# Patient Record
Sex: FEMALE | Race: WHITE | NOT HISPANIC OR LATINO | Employment: FULL TIME | URBAN - METROPOLITAN AREA
[De-identification: names, ages, dates, MRNs, and addresses within clinical notes are randomized per-mention and may not be internally consistent; named-entity substitution may affect disease eponyms.]

---

## 2024-02-02 ENCOUNTER — HOSPITAL ENCOUNTER (INPATIENT)
Facility: HOSPITAL | Age: 39
LOS: 4 days | Discharge: HOME | End: 2024-02-07
Attending: EMERGENCY MEDICINE | Admitting: HOSPITALIST
Payer: COMMERCIAL

## 2024-02-02 DIAGNOSIS — K80.20 CHOLELITHIASIS: ICD-10-CM

## 2024-02-02 DIAGNOSIS — K85.90 ACUTE PANCREATITIS, UNSPECIFIED COMPLICATION STATUS, UNSPECIFIED PANCREATITIS TYPE: Primary | ICD-10-CM

## 2024-02-02 LAB — D-LACTATE SERPL-SCNC: 1.6 MMOL/L (ref 0.3–2)

## 2024-02-02 PROCEDURE — 85025 COMPLETE CBC W/AUTO DIFF WBC: CPT | Performed by: EMERGENCY MEDICINE

## 2024-02-02 PROCEDURE — 83605 ASSAY OF LACTIC ACID: CPT | Performed by: EMERGENCY MEDICINE

## 2024-02-02 PROCEDURE — 83690 ASSAY OF LIPASE: CPT | Performed by: EMERGENCY MEDICINE

## 2024-02-02 PROCEDURE — 80053 COMPREHEN METABOLIC PANEL: CPT | Performed by: EMERGENCY MEDICINE

## 2024-02-02 PROCEDURE — 25010000002 MORPHINE PER 10 MG: Performed by: EMERGENCY MEDICINE

## 2024-02-02 PROCEDURE — 99285 EMERGENCY DEPT VISIT HI MDM: CPT

## 2024-02-02 PROCEDURE — 25810000003 SODIUM CHLORIDE 0.9 % SOLUTION: Performed by: EMERGENCY MEDICINE

## 2024-02-02 PROCEDURE — 25010000002 ONDANSETRON PER 1 MG: Performed by: EMERGENCY MEDICINE

## 2024-02-02 PROCEDURE — 84703 CHORIONIC GONADOTROPIN ASSAY: CPT | Performed by: EMERGENCY MEDICINE

## 2024-02-02 RX ORDER — ONDANSETRON 2 MG/ML
4 INJECTION INTRAMUSCULAR; INTRAVENOUS ONCE
Status: COMPLETED | OUTPATIENT
Start: 2024-02-02 | End: 2024-02-02

## 2024-02-02 RX ORDER — SODIUM CHLORIDE 0.9 % (FLUSH) 0.9 %
10 SYRINGE (ML) INJECTION AS NEEDED
Status: DISCONTINUED | OUTPATIENT
Start: 2024-02-02 | End: 2024-02-05

## 2024-02-02 RX ADMIN — MORPHINE SULFATE 4 MG: 4 INJECTION, SOLUTION INTRAMUSCULAR; INTRAVENOUS at 23:57

## 2024-02-02 RX ADMIN — SODIUM CHLORIDE 1000 ML: 9 INJECTION, SOLUTION INTRAVENOUS at 23:56

## 2024-02-02 RX ADMIN — ONDANSETRON 4 MG: 2 INJECTION INTRAMUSCULAR; INTRAVENOUS at 23:57

## 2024-02-02 NOTE — Clinical Note
Level of Care: Telemetry [5]   Admitting Physician: COMPA WEINSTEIN [[id number]]   Attending Physician: COMPA WEINSTEIN [[id number]]

## 2024-02-03 ENCOUNTER — INPATIENT HOSPITAL (OUTPATIENT)
Dept: URBAN - METROPOLITAN AREA HOSPITAL 84 | Facility: HOSPITAL | Age: 39
End: 2024-02-03

## 2024-02-03 ENCOUNTER — APPOINTMENT (OUTPATIENT)
Dept: CT IMAGING | Facility: HOSPITAL | Age: 39
End: 2024-02-03
Payer: COMMERCIAL

## 2024-02-03 DIAGNOSIS — R94.5 ABNORMAL RESULTS OF LIVER FUNCTION STUDIES: ICD-10-CM

## 2024-02-03 DIAGNOSIS — R10.13 EPIGASTRIC PAIN: ICD-10-CM

## 2024-02-03 PROBLEM — K85.90 ACUTE PANCREATITIS: Status: ACTIVE | Noted: 2024-02-03

## 2024-02-03 LAB
ALBUMIN SERPL-MCNC: 4.2 G/DL (ref 3.5–5.2)
ALBUMIN SERPL-MCNC: 4.8 G/DL (ref 3.5–5.2)
ALBUMIN/GLOB SERPL: 1.8 G/DL
ALBUMIN/GLOB SERPL: 2 G/DL
ALP SERPL-CCNC: 405 U/L (ref 39–117)
ALP SERPL-CCNC: 487 U/L (ref 39–117)
ALT SERPL W P-5'-P-CCNC: 1073 U/L (ref 1–33)
ALT SERPL W P-5'-P-CCNC: 821 U/L (ref 1–33)
ANION GAP SERPL CALCULATED.3IONS-SCNC: 15 MMOL/L (ref 5–15)
ANION GAP SERPL CALCULATED.3IONS-SCNC: 17 MMOL/L (ref 5–15)
AST SERPL-CCNC: 387 U/L (ref 1–32)
AST SERPL-CCNC: 759 U/L (ref 1–32)
BASOPHILS # BLD AUTO: 0.1 10*3/MM3 (ref 0–0.2)
BASOPHILS NFR BLD AUTO: 0.4 % (ref 0–1.5)
BILIRUB SERPL-MCNC: 1.3 MG/DL (ref 0–1.2)
BILIRUB SERPL-MCNC: 2.8 MG/DL (ref 0–1.2)
BILIRUB UR QL STRIP: NEGATIVE
BUN SERPL-MCNC: 10 MG/DL (ref 6–20)
BUN SERPL-MCNC: 13 MG/DL (ref 6–20)
BUN/CREAT SERPL: 16.1 (ref 7–25)
BUN/CREAT SERPL: 19.4 (ref 7–25)
CALCIUM SPEC-SCNC: 9 MG/DL (ref 8.6–10.5)
CALCIUM SPEC-SCNC: 9.6 MG/DL (ref 8.6–10.5)
CHLORIDE SERPL-SCNC: 102 MMOL/L (ref 98–107)
CHLORIDE SERPL-SCNC: 107 MMOL/L (ref 98–107)
CHOLEST SERPL-MCNC: 170 MG/DL (ref 0–200)
CLARITY UR: CLEAR
CO2 SERPL-SCNC: 20 MMOL/L (ref 22–29)
CO2 SERPL-SCNC: 21 MMOL/L (ref 22–29)
COLOR UR: ABNORMAL
CREAT SERPL-MCNC: 0.62 MG/DL (ref 0.57–1)
CREAT SERPL-MCNC: 0.67 MG/DL (ref 0.57–1)
DEPRECATED RDW RBC AUTO: 42.4 FL (ref 37–54)
DEPRECATED RDW RBC AUTO: 44.2 FL (ref 37–54)
EGFRCR SERPLBLD CKD-EPI 2021: 115.6 ML/MIN/1.73
EGFRCR SERPLBLD CKD-EPI 2021: 117.8 ML/MIN/1.73
EOSINOPHIL # BLD AUTO: 0.1 10*3/MM3 (ref 0–0.4)
EOSINOPHIL NFR BLD AUTO: 0.5 % (ref 0.3–6.2)
ERYTHROCYTE [DISTWIDTH] IN BLOOD BY AUTOMATED COUNT: 14 % (ref 12.3–15.4)
ERYTHROCYTE [DISTWIDTH] IN BLOOD BY AUTOMATED COUNT: 14.1 % (ref 12.3–15.4)
GLOBULIN UR ELPH-MCNC: 2.4 GM/DL
GLOBULIN UR ELPH-MCNC: 2.4 GM/DL
GLUCOSE SERPL-MCNC: 108 MG/DL (ref 65–99)
GLUCOSE SERPL-MCNC: 127 MG/DL (ref 65–99)
GLUCOSE UR STRIP-MCNC: NEGATIVE MG/DL
HCG SERPL QL: NEGATIVE
HCT VFR BLD AUTO: 45.3 % (ref 34–46.6)
HCT VFR BLD AUTO: 47.7 % (ref 34–46.6)
HDLC SERPL-MCNC: 71 MG/DL (ref 40–60)
HGB BLD-MCNC: 14.9 G/DL (ref 12–15.9)
HGB BLD-MCNC: 15.1 G/DL (ref 12–15.9)
HGB UR QL STRIP.AUTO: NEGATIVE
KETONES UR QL STRIP: NEGATIVE
LDLC SERPL CALC-MCNC: 86 MG/DL (ref 0–100)
LDLC/HDLC SERPL: 1.21 {RATIO}
LEUKOCYTE ESTERASE UR QL STRIP.AUTO: NEGATIVE
LIPASE SERPL-CCNC: 1986 U/L (ref 13–60)
LIPASE SERPL-CCNC: >3000 U/L (ref 13–60)
LYMPHOCYTES # BLD AUTO: 0.9 10*3/MM3 (ref 0.7–3.1)
LYMPHOCYTES NFR BLD AUTO: 5.5 % (ref 19.6–45.3)
MCH RBC QN AUTO: 27.4 PG (ref 26.6–33)
MCH RBC QN AUTO: 27.9 PG (ref 26.6–33)
MCHC RBC AUTO-ENTMCNC: 31.7 G/DL (ref 31.5–35.7)
MCHC RBC AUTO-ENTMCNC: 32.9 G/DL (ref 31.5–35.7)
MCV RBC AUTO: 84.9 FL (ref 79–97)
MCV RBC AUTO: 86.4 FL (ref 79–97)
MONOCYTES # BLD AUTO: 0.8 10*3/MM3 (ref 0.1–0.9)
MONOCYTES NFR BLD AUTO: 5 % (ref 5–12)
NEUTROPHILS NFR BLD AUTO: 13.8 10*3/MM3 (ref 1.7–7)
NEUTROPHILS NFR BLD AUTO: 88.6 % (ref 42.7–76)
NITRITE UR QL STRIP: NEGATIVE
NRBC BLD AUTO-RTO: 0 /100 WBC (ref 0–0.2)
PH UR STRIP.AUTO: <=5 [PH] (ref 5–8)
PLATELET # BLD AUTO: 291 10*3/MM3 (ref 140–450)
PLATELET # BLD AUTO: 334 10*3/MM3 (ref 140–450)
PMV BLD AUTO: 8.9 FL (ref 6–12)
PMV BLD AUTO: 9.1 FL (ref 6–12)
POTASSIUM SERPL-SCNC: 3.8 MMOL/L (ref 3.5–5.2)
POTASSIUM SERPL-SCNC: 3.9 MMOL/L (ref 3.5–5.2)
PROT SERPL-MCNC: 6.6 G/DL (ref 6–8.5)
PROT SERPL-MCNC: 7.2 G/DL (ref 6–8.5)
PROT UR QL STRIP: NEGATIVE
RBC # BLD AUTO: 5.34 10*6/MM3 (ref 3.77–5.28)
RBC # BLD AUTO: 5.52 10*6/MM3 (ref 3.77–5.28)
SODIUM SERPL-SCNC: 140 MMOL/L (ref 136–145)
SODIUM SERPL-SCNC: 142 MMOL/L (ref 136–145)
SP GR UR STRIP: 1.02 (ref 1–1.03)
TRIGL SERPL-MCNC: 65 MG/DL (ref 0–150)
UROBILINOGEN UR QL STRIP: ABNORMAL
VLDLC SERPL-MCNC: 13 MG/DL (ref 5–40)
WBC NRBC COR # BLD AUTO: 10.4 10*3/MM3 (ref 3.4–10.8)
WBC NRBC COR # BLD AUTO: 15.6 10*3/MM3 (ref 3.4–10.8)

## 2024-02-03 PROCEDURE — 80053 COMPREHEN METABOLIC PANEL: CPT | Performed by: HOSPITALIST

## 2024-02-03 PROCEDURE — 83690 ASSAY OF LIPASE: CPT | Performed by: HOSPITALIST

## 2024-02-03 PROCEDURE — 25510000001 IOPAMIDOL PER 1 ML: Performed by: EMERGENCY MEDICINE

## 2024-02-03 PROCEDURE — 87147 CULTURE TYPE IMMUNOLOGIC: CPT | Performed by: EMERGENCY MEDICINE

## 2024-02-03 PROCEDURE — 81003 URINALYSIS AUTO W/O SCOPE: CPT | Performed by: EMERGENCY MEDICINE

## 2024-02-03 PROCEDURE — 87154 CUL TYP ID BLD PTHGN 6+ TRGT: CPT | Performed by: EMERGENCY MEDICINE

## 2024-02-03 PROCEDURE — 25010000002 ENOXAPARIN PER 10 MG: Performed by: HOSPITALIST

## 2024-02-03 PROCEDURE — 74177 CT ABD & PELVIS W/CONTRAST: CPT

## 2024-02-03 PROCEDURE — 36415 COLL VENOUS BLD VENIPUNCTURE: CPT | Performed by: HOSPITALIST

## 2024-02-03 PROCEDURE — 99222 1ST HOSP IP/OBS MODERATE 55: CPT | Performed by: NURSE PRACTITIONER

## 2024-02-03 PROCEDURE — 25010000002 ONDANSETRON PER 1 MG: Performed by: HOSPITALIST

## 2024-02-03 PROCEDURE — 25010000002 MORPHINE PER 10 MG: Performed by: HOSPITALIST

## 2024-02-03 PROCEDURE — 25010000002 CEFTRIAXONE PER 250 MG: Performed by: EMERGENCY MEDICINE

## 2024-02-03 PROCEDURE — 25810000003 LACTATED RINGERS PER 1000 ML: Performed by: HOSPITALIST

## 2024-02-03 PROCEDURE — 80061 LIPID PANEL: CPT | Performed by: HOSPITALIST

## 2024-02-03 PROCEDURE — 87040 BLOOD CULTURE FOR BACTERIA: CPT | Performed by: EMERGENCY MEDICINE

## 2024-02-03 PROCEDURE — 25810000003 LACTATED RINGERS PER 1000 ML: Performed by: NURSE PRACTITIONER

## 2024-02-03 PROCEDURE — 85027 COMPLETE CBC AUTOMATED: CPT | Performed by: HOSPITALIST

## 2024-02-03 RX ORDER — POLYETHYLENE GLYCOL 3350 17 G/17G
17 POWDER, FOR SOLUTION ORAL DAILY PRN
Status: DISCONTINUED | OUTPATIENT
Start: 2024-02-03 | End: 2024-02-05

## 2024-02-03 RX ORDER — BISACODYL 10 MG
10 SUPPOSITORY, RECTAL RECTAL ONCE
Status: COMPLETED | OUTPATIENT
Start: 2024-02-03 | End: 2024-02-03

## 2024-02-03 RX ORDER — BISACODYL 10 MG
10 SUPPOSITORY, RECTAL RECTAL DAILY PRN
Status: DISCONTINUED | OUTPATIENT
Start: 2024-02-03 | End: 2024-02-05

## 2024-02-03 RX ORDER — AMOXICILLIN 250 MG
2 CAPSULE ORAL 2 TIMES DAILY
Status: DISCONTINUED | OUTPATIENT
Start: 2024-02-03 | End: 2024-02-05

## 2024-02-03 RX ORDER — SODIUM CHLORIDE 0.9 % (FLUSH) 0.9 %
10 SYRINGE (ML) INJECTION AS NEEDED
Status: DISCONTINUED | OUTPATIENT
Start: 2024-02-03 | End: 2024-02-05

## 2024-02-03 RX ORDER — SODIUM CHLORIDE, SODIUM LACTATE, POTASSIUM CHLORIDE, CALCIUM CHLORIDE 600; 310; 30; 20 MG/100ML; MG/100ML; MG/100ML; MG/100ML
200 INJECTION, SOLUTION INTRAVENOUS CONTINUOUS
Status: DISCONTINUED | OUTPATIENT
Start: 2024-02-03 | End: 2024-02-04

## 2024-02-03 RX ORDER — SODIUM CHLORIDE 0.9 % (FLUSH) 0.9 %
10 SYRINGE (ML) INJECTION EVERY 12 HOURS SCHEDULED
Status: DISCONTINUED | OUTPATIENT
Start: 2024-02-03 | End: 2024-02-05

## 2024-02-03 RX ORDER — ENOXAPARIN SODIUM 100 MG/ML
40 INJECTION SUBCUTANEOUS EVERY 24 HOURS
Status: DISCONTINUED | OUTPATIENT
Start: 2024-02-03 | End: 2024-02-07 | Stop reason: HOSPADM

## 2024-02-03 RX ORDER — ONDANSETRON 2 MG/ML
4 INJECTION INTRAMUSCULAR; INTRAVENOUS EVERY 6 HOURS PRN
Status: DISCONTINUED | OUTPATIENT
Start: 2024-02-03 | End: 2024-02-07 | Stop reason: HOSPADM

## 2024-02-03 RX ORDER — BISACODYL 5 MG/1
5 TABLET, DELAYED RELEASE ORAL DAILY PRN
Status: DISCONTINUED | OUTPATIENT
Start: 2024-02-03 | End: 2024-02-05

## 2024-02-03 RX ORDER — SODIUM CHLORIDE 9 MG/ML
40 INJECTION, SOLUTION INTRAVENOUS AS NEEDED
Status: DISCONTINUED | OUTPATIENT
Start: 2024-02-03 | End: 2024-02-07 | Stop reason: HOSPADM

## 2024-02-03 RX ADMIN — SODIUM CHLORIDE, POTASSIUM CHLORIDE, SODIUM LACTATE AND CALCIUM CHLORIDE 200 ML/HR: 600; 310; 30; 20 INJECTION, SOLUTION INTRAVENOUS at 20:35

## 2024-02-03 RX ADMIN — ONDANSETRON 4 MG: 2 INJECTION INTRAMUSCULAR; INTRAVENOUS at 08:53

## 2024-02-03 RX ADMIN — MORPHINE SULFATE 4 MG: 4 INJECTION, SOLUTION INTRAMUSCULAR; INTRAVENOUS at 03:09

## 2024-02-03 RX ADMIN — Medication 10 ML: at 08:44

## 2024-02-03 RX ADMIN — IOPAMIDOL 100 ML: 755 INJECTION, SOLUTION INTRAVENOUS at 00:55

## 2024-02-03 RX ADMIN — CEFTRIAXONE 1000 MG: 1 INJECTION, POWDER, FOR SOLUTION INTRAMUSCULAR; INTRAVENOUS at 02:35

## 2024-02-03 RX ADMIN — MORPHINE SULFATE 4 MG: 4 INJECTION, SOLUTION INTRAMUSCULAR; INTRAVENOUS at 08:44

## 2024-02-03 RX ADMIN — SODIUM CHLORIDE, POTASSIUM CHLORIDE, SODIUM LACTATE AND CALCIUM CHLORIDE 200 ML/HR: 600; 310; 30; 20 INJECTION, SOLUTION INTRAVENOUS at 10:20

## 2024-02-03 RX ADMIN — MORPHINE SULFATE 4 MG: 4 INJECTION, SOLUTION INTRAMUSCULAR; INTRAVENOUS at 17:33

## 2024-02-03 RX ADMIN — ENOXAPARIN SODIUM 40 MG: 100 INJECTION SUBCUTANEOUS at 17:02

## 2024-02-03 RX ADMIN — MORPHINE SULFATE 4 MG: 4 INJECTION, SOLUTION INTRAMUSCULAR; INTRAVENOUS at 23:51

## 2024-02-03 RX ADMIN — SODIUM CHLORIDE, POTASSIUM CHLORIDE, SODIUM LACTATE AND CALCIUM CHLORIDE 150 ML/HR: 600; 310; 30; 20 INJECTION, SOLUTION INTRAVENOUS at 03:10

## 2024-02-03 RX ADMIN — SODIUM CHLORIDE, POTASSIUM CHLORIDE, SODIUM LACTATE AND CALCIUM CHLORIDE 200 ML/HR: 600; 310; 30; 20 INJECTION, SOLUTION INTRAVENOUS at 15:15

## 2024-02-03 RX ADMIN — MORPHINE SULFATE 4 MG: 4 INJECTION, SOLUTION INTRAMUSCULAR; INTRAVENOUS at 13:17

## 2024-02-03 RX ADMIN — BISACODYL 10 MG: 10 SUPPOSITORY RECTAL at 08:44

## 2024-02-03 RX ADMIN — ONDANSETRON 4 MG: 2 INJECTION INTRAMUSCULAR; INTRAVENOUS at 23:58

## 2024-02-03 NOTE — H&P
[facility] Medicine Services  History & Physical    Patient Name: Magaly Aggarwal  : 1985  MRN: [id number]  Primary Care Physician:  Tammi Ny MD  Date of admission: 2024  Date and Time of Service: 2024 at 02:12 EST      Subjective      Chief Complaint: Abdominal pain    History of Present Illness: Magaly Aggarwal is a 38 y.o. female with no significant past medical history who presented to [facility] on 2024 with complaints of abdominal pain.  Per patient, she has been having abdominal pain for the past 1 week.  Stated that she felt bloated.  Abdominal pain is mainly in the epigastric region.  She had some nausea as well as vomiting in the past couple of days. Of note, patient is 4 months postpartum.  Pregnancy was uncomplicated except for high blood pressure on the day of delivery.  Denies any history of pancreatitis.  Denies any history of alcohol use or having gallstones.  Denies any fever or chills.    In the ED, blood pressure 182/116.  Labs remarkable for WBC 15.6, , 6 , ALT 1073, T. bili 2.8, lipase greater than 3000.  CT abdomen shows findings suggestive of acute pancreatitis.  There is a 1 cm stone in the dependent aspect of the gallbladder.    Review of Systems  As above  Personal History     No past medical history on file.    No past surgical history on file.    Family History: family history is not on file. Otherwise pertinent FHx was reviewed and not pertinent to current issue.    Social History:      Home Medications:  Prior to Admission Medications       None              Allergies:  No Known Allergies    Objective      Vitals:   Temp:  [97.5 °F (36.4 °C)] 97.5 °F (36.4 °C)  Heart Rate:  [62-86] 62  Resp:  [17] 17  BP: (146-182)/() 146/81  Body mass index is 25.83 kg/m².  Physical Exam  General Appearance: AOO x 4, cooperative, no distress, appropriate for age  Head:  Normocephalic, without obvious  abnormality  Eyes:  PERRL, EOM's intact, conjunctivae and cornea clear  Nose:  Nares symmetrical, septum midline, mucosa pink  Throat:  Lips, tongue, and mucosa are moist, pink, and intact  Neck:  Supple; symmetrical, trachea midline, no adenopathy  Back:  Symmetrical, ROM normal, no CVA tenderness  Lungs: Respirations unlabored, no audible wheeze  Heart: Regular rate & rhythm, S1 and S2 normal  Abdomen: Tender to palpation in the epigastric region.    Musculoskeletal: Tone and strength strong and symmetrical, all extremities; no joint pain or edema         Skin/Hair/Nails:  Skin warm, dry and intact, no rashes or abnormal dyspigmentation       Diagnostic Data:  Lab Results (last 24 hours)       Procedure Component Value Units Date/Time    Urinalysis With Culture If Indicated - Urine, Clean Catch [[id number]]  (Abnormal) Collected: 02/03/24 0110    Specimen: Urine, Clean Catch Updated: 02/03/24 0117     Color, UA Dark Yellow     Appearance, UA Clear     pH, UA <=5.0     Specific Gravity, UA 1.017     Glucose, UA Negative     Ketones, UA Negative     Bilirubin, UA Negative     Blood, UA Negative     Protein, UA Negative     Leuk Esterase, UA Negative     Nitrite, UA Negative     Urobilinogen, UA 0.2 E.U./dL    Narrative:      In absence of clinical symptoms, the presence of pyuria, bacteria, and/or nitrites on the urinalysis result does not correlate with infection.  Urine microscopic not indicated.    Lipase [[id number]]  (Abnormal) Collected: 02/02/24 2351    Specimen: Blood Updated: 02/03/24 0101     Lipase >3,000 U/L     Comprehensive Metabolic Panel [[id number]]  (Abnormal) Collected: 02/02/24 2351    Specimen: Blood Updated: 02/03/24 0101     Glucose 127 mg/dL      BUN 13 mg/dL      Creatinine 0.67 mg/dL      Sodium 140 mmol/L      Potassium 3.9 mmol/L      Comment: Slight hemolysis detected by analyzer. Result may be falsely elevated.        Chloride 102 mmol/L      CO2 21.0 mmol/L      Calcium 9.6 mg/dL       Total Protein 7.2 g/dL      Albumin 4.8 g/dL      ALT (SGPT) 1,073 U/L      AST (SGOT) 759 U/L      Comment: Slight hemolysis detected by analyzer. Result may be falsely elevated.        Alkaline Phosphatase 487 U/L      Total Bilirubin 2.8 mg/dL      Globulin 2.4 gm/dL      A/G Ratio 2.0 g/dL      BUN/Creatinine Ratio 19.4     Anion Gap 17.0 mmol/L      eGFR 115.6 mL/min/1.73     Narrative:      GFR Normal >60  Chronic Kidney Disease <60  Kidney Failure <15      hCG, Serum, Qualitative [[id number]]  (Normal) Collected: 02/02/24 2351    Specimen: Blood Updated: 02/03/24 0012     HCG Qualitative Negative    CBC & Differential [[id number]]  (Abnormal) Collected: 02/02/24 2351    Specimen: Blood Updated: 02/03/24 0000    Narrative:      The following orders were created for panel order CBC & Differential.  Procedure                               Abnormality         Status                     ---------                               -----------         ------                     CBC Auto Differential[[id number]]        Abnormal            Final result                 Please view results for these tests on the individual orders.    CBC Auto Differential [[id number]]  (Abnormal) Collected: 02/02/24 2351    Specimen: Blood Updated: 02/03/24 0000     WBC 15.60 10*3/mm3      RBC 5.52 10*6/mm3      Hemoglobin 15.1 g/dL      Hematocrit 47.7 %      MCV 86.4 fL      MCH 27.4 pg      MCHC 31.7 g/dL      RDW 14.0 %      RDW-SD 42.4 fl      MPV 8.9 fL      Platelets 334 10*3/mm3      Neutrophil % 88.6 %      Lymphocyte % 5.5 %      Monocyte % 5.0 %      Eosinophil % 0.5 %      Basophil % 0.4 %      Neutrophils, Absolute 13.80 10*3/mm3      Lymphocytes, Absolute 0.90 10*3/mm3      Monocytes, Absolute 0.80 10*3/mm3      Eosinophils, Absolute 0.10 10*3/mm3      Basophils, Absolute 0.10 10*3/mm3      nRBC 0.0 /100 WBC     POC Lactate [[id number]]  (Normal) Collected: 02/02/24 2354    Specimen: Blood Updated: 02/02/24 2355     Lactate 1.6  mmol/L      Comment: Serial Number: [id number]:  [id number]                Imaging Results (Last 24 Hours)       Procedure Component Value Units Date/Time    CT Abdomen Pelvis With Contrast [[id number]] Collected: 02/03/24 0058     Updated: 02/03/24 0106    Narrative:      CT ABDOMEN PELVIS W CONTRAST    Date of Exam: 2/3/2024 12:40 AM EST    Indication: pain.    Comparison: None available.    Technique: Axial CT images were obtained of the abdomen and pelvis following the uneventful intravenous administration of iodinated contrast. Sagittal and coronal reconstructions were performed.  Automated exposure control and iterative reconstruction   methods were used.    Findings:  There is bilateral basilar atelectasis.    There is a small stone in the dependent aspect of the gallbladder. There is trace pericholecystic fluid.    The bilateral adrenal glands, bilateral kidneys and spleen appear normal.    There is enlargement of the pancreatic body and tail with surrounding peripancreatic fluid most likely secondary to acute pancreatitis.    There is a moderate stool burden with stool to the cecum. There is no inflammatory change of the large or small bowel. There is an appendicolith but no inflammatory change of the appendix. There is a small periumbilical hernia containing only fat.    There are multiple partially calcified lobular structures of the uterus consistent with fibroid disease. The urinary bladder is unremarkable. The osseous structures are normal for age.        Impression:      Impression:  1.Fluid surrounding an enlarged pancreas suggestive of acute pancreatitis. Correlation with pancreatic enzyme levels would be recommended.  2.1 cm stone in the dependent aspect of the gallbladder with trace pericholecystic fluid.  3.Moderate stool burden suggest constipation.  4.Multiple uterine fibroids some of which are centrally low density and peripherally calcified likely necrotic.            Electronically  Signed: Dillan Tran MD    2/3/2024 1:04 AM EST    Workstation ID: [id number]              Assessment & Plan        This is a 38 y.o. female with PMH of    Active and Resolved Problems  Active Hospital Problems    Diagnosis  POA    **Acute pancreatitis [K85.90]  Yes      Resolved Hospital Problems   No resolved problems to display.       Acute pancreatitis  Cholelithiasis  Abdominal pain  Leukocytosis  Keep patient n.p.o.  Will start LR at 150 cc/h  Consult GI  Empiric coverage with Rocephin  Repeat lipase, CBC and CMP in the morning  Obtain lipid panel to check for triglycerides level  Pain control and antiemetics      DVT prophylaxis:  Medical DVT prophylaxis orders are present.      CODE STATUS:    Code Status (Patient has no pulse and is not breathing): CPR (Attempt to Resuscitate)  Medical Interventions (Patient has pulse or is breathing): Full Support        Admission Status:  I believe this patient meets inpt status.      I discussed the patient's findings and my recommendations with patient.      Signature:     This document has been electronically signed by Mine Cifuentes MD on February 3, 2024 01:57 [facility] Hospitalist Team

## 2024-02-03 NOTE — PLAN OF CARE
Goal Outcome Evaluation:  Plan of Care Reviewed With: patient        Progress: no change  Outcome Evaluation: VSS, admitted from ED with acute pancreatitis. Started on IVF. First dose IV abx given. NPO until evaluated by GI. Consult called into office. IV Morphine given x1 for pain. Pt up ad debbie to [facility]. AM labs completed per order. Able to xfer pt to [facility], report to [facility], transported by wheelchair.

## 2024-02-03 NOTE — CONSULTS
GI CONSULT  NOTE:    Referring Provider:     Dr. Cifuentes    Chief complaint:    Acute pancreatitis    Subjective    Upper abdominal pain    History of present illness:     Patient is a 38-year-old 4-month postpartum female who presented to the ER on 2/2/2024 with a complaint of upper abdominal pain for the last 2 days.  Patient had some associated nausea.  Patient was evaluated in the ER via labs and CT and was found to have elevated LFTs, lipase.  CT of the abdomen pelvis confirmed acute pancreatitis.  GI was consulted.  Patient states she has had 6-7 episodes of what she thought was gas pain since having her child 4 months ago.  Patient states her most recent symptoms started on Thursday, 2/1/2024.  Pain is in the epigastric to left upper quadrant.  Pain usually last a couple of hours and then resolves.  Pain was better yesterday Friday but then returned about 1 hour after eating dinner Friday night.  Hot shower helped.  She noticed her urine was turning darker on that day.  She had 4 episodes of nausea and vomiting that she denies was bloody.  Patient has been having daily bowel movements generally first thing in the morning.  Has not been feeling constipated.  Patient has been on no months for the previous 1 month.  States she was diagnosed with a left lower extremity DVT in March 2023 when she was 10 weeks pregnant.  She was on Lovenox at 1 point but has been off of it since October 2023.  Patient states she also had a episode of SVT during pregnancy and she has been off of metoprolol for the previous 1 month.  Patient denies any previous history of pancreatitis.  No family history of pancreatitis or pancreatic cancer.  Patient denies any history of hyperlipidemia.  Patient states she maybe has 1 drink per month of alcohol but none recently.  Patient denied having any history of gallstones.  Patient states her mother did have her gallbladder removed several years ago.  Patient is breast-feeding.    Labs:  Creatinine 0.67.  Total bilirubin 2.8, alk phos 47, , ALT 1073.  Lipase greater than 3000.  WBCs 10.4, hemoglobin 14.9, platelets 291.  CT with contrast showed acute pancreatitis with fluid surrounding and enlarged pancreas.  2.1 cm stone dependent aspect of the gallbladder with trace pericholecystic fluid.  Moderate stool burden.    Endo History:  None    Past Medical History:  History reviewed. No pertinent past medical history.    Past Surgical History: [place] teeth removal  History reviewed. No pertinent surgical history.    Social History:  Social History     Tobacco Use    Smoking status: Never     Passive exposure: Never    Smokeless tobacco: Never   Vaping Use    Vaping Use: Never used   Substance Use Topics    Alcohol use: Defer    Drug use: Never       Family History:  History reviewed. No pertinent family history.    Medications:  Medications Prior to Admission   Medication Sig Dispense Refill Last Dose    Etonogestrel (NEXPLANON) 68 MG implant subdermal implant Inject 1 each into the appropriate area of the skin as directed by provider 1 (One) Time.          Scheduled Meds:[START ON 2/4/2024] cefTRIAXone, 1,000 mg, Intravenous, Q24H  enoxaparin, 40 mg, Subcutaneous, Q24H  senna-docusate sodium, 2 tablet, Oral, BID  sodium chloride, 10 mL, Intravenous, Q12H      Continuous Infusions:lactated ringers, 150 mL/hr, Last Rate: 150 mL/hr (02/03/24 0310)      PRN Meds:.  senna-docusate sodium **AND** polyethylene glycol **AND** bisacodyl **AND** bisacodyl    Morphine    ondansetron    [COMPLETED] Insert Peripheral IV **AND** sodium chloride    sodium chloride    sodium chloride    ALLERGIES:  Patient has no known allergies.    ROS:  Review of Systems   Gastrointestinal:  Positive for abdominal pain, nausea and vomiting. Negative for anal bleeding, blood in stool, constipation, diarrhea and rectal pain.       The following systems were reviewed and negative;    Constitution:  No fevers, chills, no  unintentional weight loss  Skin: no rash, no jaundice  Eyes:  No blurry vision, no eye pain  HENT:  No change in hearing or smell  Resp:  No dyspnea or cough  CV:  No chest pain or palpitations  :  No dysuria, hematuria  Musculoskeletal:  No leg cramps or arthralgias  Neuro:  No tremor, no numbness  Psych:  No depression or confusion    Objective  Resting in the hospital bed. NAD. No family present. Rm 406    Vital Signs:   Vitals:    02/03/24 0002 02/03/24 0017 02/03/24 0317 02/03/24 0618   BP: 151/88 146/81 148/82 135/88   BP Location:   Right arm Left arm   Patient Position:   Lying Lying   Pulse: 73 62 79 69   Resp:   22 20   Temp:   98.2 °F (36.8 °C) 97.8 °F (36.6 °C)   TempSrc:   Oral Oral   SpO2: 100% 100% 99% 98%   Weight:       Height:           Physical Exam:    General Appearance:    Awake and alert, in no acute distress   Head:    Normocephalic, without obvious abnormality, atraumatic   Eyes:            Conjunctivae normal, anicteric sclerae, pupils equal   Ears:    Ears appear intact with no abnormalities noted   Throat:   No oral lesions, no thrush, oral mucosa moist   Neck:   Supple, no JVD   Lungs:     Clear to auscultation bilaterally, respirations regular, even and unlabored        Chest Wall:    No abnormalities observed   Abdomen:     Normal bowel sounds, soft, tender, no rebound or guarding, nondistended, no hepatosplenomegaly   Rectal:     Deferred   Extremities:   Moves all extremities, no edema, no cyanosis   Pulses:   Pulses palpable and equal bilaterally   Skin:   No rash, no jaundice, normal palpation        Neurologic:   Cranial nerves 2 - 12 grossly intact, no asterixis       Results Review:   I reviewed the patient's labs and imaging.  CBC    Results from last 7 days   Lab Units 02/03/24  0531 02/02/24  2351   WBC 10*3/mm3 10.40 15.60*   HEMOGLOBIN g/dL 14.9 15.1   PLATELETS 10*3/mm3 291 334     CMP   Results from last 7 days   Lab Units 02/02/24  2351   SODIUM mmol/L 140   POTASSIUM  "mmol/L 3.9   CHLORIDE mmol/L 102   CO2 mmol/L 21.0*   BUN mg/dL 13   CREATININE mg/dL 0.67   GLUCOSE mg/dL 127*   ALBUMIN g/dL 4.8   BILIRUBIN mg/dL 2.8*   ALK PHOS U/L 487*   AST (SGOT) U/L 759*   ALT (SGPT) U/L 1,073*   LIPASE U/L >3,000*     Cr Clearance Estimated Creatinine Clearance: 148.1 mL/min (by C-G formula based on SCr of 0.67 mg/dL).  Coag     HbA1C   Lab Results   Component Value Date    HGBA1C 4.8 05/02/2023     Blood Glucose No results found for: \"POCGLU\"  Infection     UA    Results from last 7 days   Lab Units 02/03/24  0110   NITRITE UA  Negative     Radiology(recent) CT Abdomen Pelvis With Contrast    Result Date: 2/3/2024  Impression: 1.Fluid surrounding an enlarged pancreas suggestive of acute pancreatitis. Correlation with pancreatic enzyme levels would be recommended. 2.1 cm stone in the dependent aspect of the gallbladder with trace pericholecystic fluid. 3.Moderate stool burden suggest constipation. 4.Multiple uterine fibroids some of which are centrally low density and peripherally calcified likely necrotic. Electronically Signed: Dillan Tran MD  2/3/2024 1:04 AM EST  Workstation ID: [id number]       ASSESSMENT:  Acute pancreatitis questionable biliary related  Abdominal pain related to above  Gallstone  Postpartum x 4 months  Constipation  Elevated LFTs related to pancreatitis  Left lower extremity DVT off anticoagulation  History of SVT off metoprolol x 1 month  Breast-feeding    PLAN:    Pending triglyceride level.  Recommend n.p.o. status with IV fluids for hydration until patient is under better pain control.  Increase lactated Ringer's to 200 cc an hour.  Antiemetic and pain medications as needed.  Would suggest eventual cholecystectomy as microlithiasis could be the cause of her pancreatitis she does not drink alcohol and she is on no medications that are known to cause pancreatitis.  Agree with Rocephin.   Okay for ice chips if it does not cause her increased pain nausea or vomiting. "  Discussed parameters for discharge.  Discussed pump and dump.  Will get Dulcolax suppository for constipation.    I discussed the patient's findings and my recommendations with the patient.  BORA Sauceda  02/03/24  08:19 EST    Time:

## 2024-02-03 NOTE — PLAN OF CARE
Problem: Adult Inpatient Plan of Care  Goal: Plan of Care Review  Outcome: Ongoing, Progressing  Goal: Patient-Specific Goal (Individualized)  Outcome: Ongoing, Progressing  Goal: Absence of Hospital-Acquired Illness or Injury  Outcome: Ongoing, Progressing  Intervention: Identify and Manage Fall Risk  Recent Flowsheet Documentation  Taken 2/3/2024 1415 by Mabel Ricardo RN  Safety Promotion/Fall Prevention:   safety round/check completed   room organization consistent   nonskid shoes/slippers when out of bed   lighting adjusted   clutter free environment maintained  Taken 2/3/2024 1225 by Mabel Ricardo RN  Safety Promotion/Fall Prevention:   safety round/check completed   room organization consistent   nonskid shoes/slippers when out of bed   lighting adjusted   clutter free environment maintained  Taken 2/3/2024 1000 by Mabel Ricardo RN  Safety Promotion/Fall Prevention:   safety round/check completed   room organization consistent   nonskid shoes/slippers when out of bed   lighting adjusted   clutter free environment maintained  Taken 2/3/2024 0822 by Mabel Ricardo RN  Safety Promotion/Fall Prevention:   safety round/check completed   room organization consistent   nonskid shoes/slippers when out of bed   lighting adjusted   clutter free environment maintained  Intervention: Prevent Skin Injury  Recent Flowsheet Documentation  Taken 2/3/2024 1000 by Mabel Ricardo RN  Body Position: position changed independently  Taken 2/3/2024 0822 by Mabel Ricardo RN  Body Position: position changed independently  Intervention: Prevent and Manage VTE (Venous Thromboembolism) Risk  Recent Flowsheet Documentation  Taken 2/3/2024 1415 by Mabel Ricardo RN  Activity Management: up ad debbie  Taken 2/3/2024 1225 by Mabel Ricardo RN  Activity Management: up ad debbie  Taken 2/3/2024 1000 by Mabel Ricardo RN  Activity Management: up ad debbie  Taken 2/3/2024 0822 by Mabel Ricardo RN  Activity Management: up ad  debbie  Goal: Optimal Comfort and Wellbeing  Outcome: Ongoing, Progressing  Intervention: Provide Person-Centered Care  Recent Flowsheet Documentation  Taken 2/3/2024 0822 by Mabel Ricardo RN  Trust Relationship/Rapport:   care explained   choices provided   questions answered  Goal: Readiness for Transition of Care  Outcome: Ongoing, Progressing     Problem: Fluid Imbalance (Pancreatitis)  Goal: Fluid Balance  Outcome: Ongoing, Progressing     Problem: Infection (Pancreatitis)  Goal: Infection Symptom Resolution  Outcome: Ongoing, Progressing     Problem: Nutrition Impaired (Pancreatitis)  Goal: Optimal Nutrition Intake  Outcome: Ongoing, Progressing     Problem: Pain (Pancreatitis)  Goal: Acceptable Pain Control  Outcome: Ongoing, Progressing     Problem: Respiratory Compromise (Pancreatitis)  Goal: Effective Oxygenation and Ventilation  Outcome: Ongoing, Progressing  Intervention: Optimize Oxygenation and Ventilation  Recent Flowsheet Documentation  Taken 2/3/2024 1415 by Mabel Ricardo RN  Activity Management: up ad debbie  Taken 2/3/2024 1225 by Mabel Ricardo RN  Activity Management: up ad debbie  Taken 2/3/2024 1000 by Mabel Ricardo RN  Activity Management: up ad debbie  Taken 2/3/2024 0822 by Mabel Ricardo RN  Activity Management: up ad debbie  Head of Bed (HOB) Positioning: HOB at 30 degrees     Problem: Pain Acute  Goal: Acceptable Pain Control and Functional Ability  Outcome: Ongoing, Progressing  Intervention: Prevent or Manage Pain  Recent Flowsheet Documentation  Taken 2/3/2024 1415 by Mabel Ricardo RN  Medication Review/Management: medications reviewed  Taken 2/3/2024 1225 by Mabel Ricardo RN  Medication Review/Management: medications reviewed  Taken 2/3/2024 1000 by Mabel Ricardo RN  Medication Review/Management: medications reviewed  Taken 2/3/2024 0822 by Mabel Ricardo RN  Medication Review/Management: medications reviewed   Goal Outcome Evaluation:                   Pt resting well. Pain  "controlled w PRN IVP morphine. Tolerating ice chips. No BM noted w suppository, pt reports \"small amount of clear liquid came out\"                           "

## 2024-02-03 NOTE — ED PROVIDER NOTES
"Subjective   History of Present Illness  38-year-old female describes upper abdominal and left upper quadrant pain over the last 2 days it is increasing in intensity.  She denies any changes with meal intake.  She reports no fevers or chills.  She has had nausea.  She reports no hematemesis.  She states her urine appeared discolored and stool appeared light in color.  She reports no melena or hematochezia.  She reports no relieving or exacerbating factors.  Review of Systems  No dysuria or cough or congestion or fever no known ill contacts or unusual ingestions  No past medical history on file.  Childbirth about 4 months ago  No Known Allergies    No past surgical history on file.    No family history on file.    Social History     Socioeconomic History    Marital status:        Prior to Admission medications    Not on File     /81   Pulse 62   Temp 97.5 °F (36.4 °C) (Oral)   Resp 17   Ht 177.8 cm (70\")   Wt 81.6 kg (180 lb)   LMP  (LMP Unknown)   SpO2 100%   BMI 25.83 kg/m²       Objective   Physical Exam  General: Well-developed female in acute distress secondary to discomfort, awake alert and pleasant  Eyes:  sclera nonicteric  HEENT: Mucous membranes moist, no mucosal swelling  Neck: Supple, no nuchal rigidity, no JVD  Respirations: Respirations nonlabored, equal breath sounds bilaterally, clear lungs  Heart regular rate and rhythm, no murmurs rubs or gallops,   Abdomen soft, tender palpation left upper quadrant, no rebound or guarding, no Rogers sign, nondistended, no hepatosplenomegaly, no hernia, no mass, normal bowel sounds, no CVA tenderness  Extremities no clubbing cyanosis or edema, calves are symmetric and nontender  Neuro cranial nerves grossly intact, no focal limb deficits  Psych oriented, pleasant affect  Skin no rash, brisk cap refill  Procedures           ED Course      Results for orders placed or performed during the hospital encounter of 02/02/24   Comprehensive Metabolic " Panel    Specimen: Blood   Result Value Ref Range    Glucose 127 (H) 65 - 99 mg/dL    BUN 13 6 - 20 mg/dL    Creatinine 0.67 0.57 - 1.00 mg/dL    Sodium 140 136 - 145 mmol/L    Potassium 3.9 3.5 - 5.2 mmol/L    Chloride 102 98 - 107 mmol/L    CO2 21.0 (L) 22.0 - 29.0 mmol/L    Calcium 9.6 8.6 - 10.5 mg/dL    Total Protein 7.2 6.0 - 8.5 g/dL    Albumin 4.8 3.5 - 5.2 g/dL    ALT (SGPT) 1,073 (H) 1 - 33 U/L    AST (SGOT) 759 (H) 1 - 32 U/L    Alkaline Phosphatase 487 (H) 39 - 117 U/L    Total Bilirubin 2.8 (H) 0.0 - 1.2 mg/dL    Globulin 2.4 gm/dL    A/G Ratio 2.0 g/dL    BUN/Creatinine Ratio 19.4 7.0 - 25.0    Anion Gap 17.0 (H) 5.0 - 15.0 mmol/L    eGFR 115.6 >60.0 mL/min/1.73   Lipase    Specimen: Blood   Result Value Ref Range    Lipase >3,000 (H) 13 - 60 U/L   hCG, Serum, Qualitative    Specimen: Blood   Result Value Ref Range    HCG Qualitative Negative Negative   Urinalysis With Culture If Indicated - Urine, Clean Catch    Specimen: Urine, Clean Catch   Result Value Ref Range    Color, UA Dark Yellow (A) Yellow, Straw    Appearance, UA Clear Clear    pH, UA <=5.0 5.0 - 8.0    Specific Gravity, UA 1.017 1.005 - 1.030    Glucose, UA Negative Negative    Ketones, UA Negative Negative    Bilirubin, UA Negative Negative    Blood, UA Negative Negative    Protein, UA Negative Negative    Leuk Esterase, UA Negative Negative    Nitrite, UA Negative Negative    Urobilinogen, UA 0.2 E.U./dL 0.2 - 1.0 E.U./dL   CBC Auto Differential    Specimen: Blood   Result Value Ref Range    WBC 15.60 (H) 3.40 - 10.80 10*3/mm3    RBC 5.52 (H) 3.77 - 5.28 10*6/mm3    Hemoglobin 15.1 12.0 - 15.9 g/dL    Hematocrit 47.7 (H) 34.0 - 46.6 %    MCV 86.4 79.0 - 97.0 fL    MCH 27.4 26.6 - 33.0 pg    MCHC 31.7 31.5 - 35.7 g/dL    RDW 14.0 12.3 - 15.4 %    RDW-SD 42.4 37.0 - 54.0 fl    MPV 8.9 6.0 - 12.0 fL    Platelets 334 140 - 450 10*3/mm3    Neutrophil % 88.6 (H) 42.7 - 76.0 %    Lymphocyte % 5.5 (L) 19.6 - 45.3 %    Monocyte % 5.0 5.0 - 12.0  %    Eosinophil % 0.5 0.3 - 6.2 %    Basophil % 0.4 0.0 - 1.5 %    Neutrophils, Absolute 13.80 (H) 1.70 - 7.00 10*3/mm3    Lymphocytes, Absolute 0.90 0.70 - 3.10 10*3/mm3    Monocytes, Absolute 0.80 0.10 - 0.90 10*3/mm3    Eosinophils, Absolute 0.10 0.00 - 0.40 10*3/mm3    Basophils, Absolute 0.10 0.00 - 0.20 10*3/mm3    nRBC 0.0 0.0 - 0.2 /100 WBC   POC Lactate    Specimen: Blood   Result Value Ref Range    Lactate 1.6 0.3 - 2.0 mmol/L     CT Abdomen Pelvis With Contrast    Result Date: 2/3/2024  Impression: 1.Fluid surrounding an enlarged pancreas suggestive of acute pancreatitis. Correlation with pancreatic enzyme levels would be recommended. 2.1 cm stone in the dependent aspect of the gallbladder with trace pericholecystic fluid. 3.Moderate stool burden suggest constipation. 4.Multiple uterine fibroids some of which are centrally low density and peripherally calcified likely necrotic. Electronically Signed: Dillan Tran MD  2/3/2024 1:04 AM EST  Workstation ID: [id number]                                          Medical Decision Making  Presents with upper abdominal pain differential diagnosis including bowel obstruction, cholecystitis, pancreatitis, ulcer    She was ordered IV morphine and Zofran for acute pain management as well as IV fluids.  She was resting more company reexamination.  Patient has no signs of peritonitis or acute abdomen.  She was advised of the findings showing findings of acute pancreatitis and liver enzyme elevation and gallstone.  She did have some leukocytosis she was ordered some IV Rocephin.  Case discussed with Dr. Cifuentes with the hospitalist service for admission.  Admission was discussed with the patient and she is agreeable to the plan.    Problems Addressed:  Acute pancreatitis, unspecified complication status, unspecified pancreatitis type: complicated acute illness or injury    Amount and/or Complexity of Data Reviewed  Labs: ordered. Decision-making details documented in ED  Course.     Details: Elevated lipase, elevated transaminase and bilirubin, lactate normal, CBC shows some leukocytosis, normal hemoglobin, hCG negative  Radiology: ordered and independent interpretation performed.     Details: My independent interpretation of CT abdomen image gallstone in the gallbladder, inflammation of the pancreas, fibroids    Risk  Prescription drug management.  Decision regarding hospitalization.        Final diagnoses:   Acute pancreatitis, unspecified complication status, unspecified pancreatitis type       ED Disposition  ED Disposition       ED Disposition   Decision to Admit    Condition   --    Comment   Level of Care: Telemetry [5]   Admitting Physician: COMPA WEINSTEIN [[id number]]   Attending Physician: COMPA WEINSTEIN [[id number]]                 No follow-up provider specified.       Medication List      No changes were made to your prescriptions during this visit.            Red Arias MD  02/03/24 0152

## 2024-02-04 ENCOUNTER — APPOINTMENT (OUTPATIENT)
Dept: MRI IMAGING | Facility: HOSPITAL | Age: 39
End: 2024-02-04
Payer: COMMERCIAL

## 2024-02-04 ENCOUNTER — INPATIENT HOSPITAL (OUTPATIENT)
Dept: URBAN - METROPOLITAN AREA HOSPITAL 84 | Facility: HOSPITAL | Age: 39
End: 2024-02-04

## 2024-02-04 DIAGNOSIS — K59.00 CONSTIPATION, UNSPECIFIED: ICD-10-CM

## 2024-02-04 DIAGNOSIS — R74.01 ELEVATION OF LEVELS OF LIVER TRANSAMINASE LEVELS: ICD-10-CM

## 2024-02-04 DIAGNOSIS — K85.10 BILIARY ACUTE PANCREATITIS WITHOUT NECROSIS OR INFECTION: ICD-10-CM

## 2024-02-04 DIAGNOSIS — K80.20 CALCULUS OF GALLBLADDER WITHOUT CHOLECYSTITIS WITHOUT OBSTRU: ICD-10-CM

## 2024-02-04 LAB
ALBUMIN SERPL-MCNC: 4 G/DL (ref 3.5–5.2)
ALBUMIN/GLOB SERPL: 1.6 G/DL
ALP SERPL-CCNC: 334 U/L (ref 39–117)
ALT SERPL W P-5'-P-CCNC: 409 U/L (ref 1–33)
ANION GAP SERPL CALCULATED.3IONS-SCNC: 16 MMOL/L (ref 5–15)
AST SERPL-CCNC: 68 U/L (ref 1–32)
BACTERIA BLD CULT: ABNORMAL
BILIRUB SERPL-MCNC: 0.8 MG/DL (ref 0–1.2)
BOTTLE TYPE: ABNORMAL
BUN SERPL-MCNC: 7 MG/DL (ref 6–20)
BUN/CREAT SERPL: 14.3 (ref 7–25)
CALCIUM SPEC-SCNC: 9 MG/DL (ref 8.6–10.5)
CHLORIDE SERPL-SCNC: 101 MMOL/L (ref 98–107)
CO2 SERPL-SCNC: 21 MMOL/L (ref 22–29)
CREAT SERPL-MCNC: 0.49 MG/DL (ref 0.57–1)
CRP SERPL-MCNC: 13.37 MG/DL (ref 0–0.5)
DEPRECATED RDW RBC AUTO: 44.6 FL (ref 37–54)
EGFRCR SERPLBLD CKD-EPI 2021: 124.7 ML/MIN/1.73
ERYTHROCYTE [DISTWIDTH] IN BLOOD BY AUTOMATED COUNT: 14 % (ref 12.3–15.4)
GLOBULIN UR ELPH-MCNC: 2.5 GM/DL
GLUCOSE SERPL-MCNC: 70 MG/DL (ref 65–99)
HCT VFR BLD AUTO: 40 % (ref 34–46.6)
HGB BLD-MCNC: 13 G/DL (ref 12–15.9)
LIPASE SERPL-CCNC: 94 U/L (ref 13–60)
MCH RBC QN AUTO: 27.7 PG (ref 26.6–33)
MCHC RBC AUTO-ENTMCNC: 32.5 G/DL (ref 31.5–35.7)
MCV RBC AUTO: 85.2 FL (ref 79–97)
PLATELET # BLD AUTO: 264 10*3/MM3 (ref 140–450)
PMV BLD AUTO: 9.3 FL (ref 6–12)
POTASSIUM SERPL-SCNC: 3.6 MMOL/L (ref 3.5–5.2)
PROT SERPL-MCNC: 6.5 G/DL (ref 6–8.5)
RBC # BLD AUTO: 4.7 10*6/MM3 (ref 3.77–5.28)
SODIUM SERPL-SCNC: 138 MMOL/L (ref 136–145)
WBC NRBC COR # BLD AUTO: 9.5 10*3/MM3 (ref 3.4–10.8)

## 2024-02-04 PROCEDURE — A9579 GAD-BASE MR CONTRAST NOS,1ML: HCPCS | Performed by: INTERNAL MEDICINE

## 2024-02-04 PROCEDURE — 25810000003 LACTATED RINGERS PER 1000 ML: Performed by: NURSE PRACTITIONER

## 2024-02-04 PROCEDURE — 83690 ASSAY OF LIPASE: CPT | Performed by: NURSE PRACTITIONER

## 2024-02-04 PROCEDURE — 25010000002 MORPHINE PER 10 MG: Performed by: HOSPITALIST

## 2024-02-04 PROCEDURE — 74183 MRI ABD W/O CNTR FLWD CNTR: CPT

## 2024-02-04 PROCEDURE — 25010000002 GADOTERIDOL PER 1 ML: Performed by: INTERNAL MEDICINE

## 2024-02-04 PROCEDURE — 86140 C-REACTIVE PROTEIN: CPT | Performed by: NURSE PRACTITIONER

## 2024-02-04 PROCEDURE — 80053 COMPREHEN METABOLIC PANEL: CPT | Performed by: NURSE PRACTITIONER

## 2024-02-04 PROCEDURE — 85027 COMPLETE CBC AUTOMATED: CPT | Performed by: NURSE PRACTITIONER

## 2024-02-04 PROCEDURE — 25010000002 CEFTRIAXONE PER 250 MG: Performed by: HOSPITALIST

## 2024-02-04 PROCEDURE — 99232 SBSQ HOSP IP/OBS MODERATE 35: CPT | Performed by: NURSE PRACTITIONER

## 2024-02-04 RX ORDER — HYDROCODONE BITARTRATE AND ACETAMINOPHEN 5; 325 MG/1; MG/1
1 TABLET ORAL EVERY 6 HOURS PRN
Status: DISCONTINUED | OUTPATIENT
Start: 2024-02-04 | End: 2024-02-07 | Stop reason: HOSPADM

## 2024-02-04 RX ADMIN — SODIUM CHLORIDE, POTASSIUM CHLORIDE, SODIUM LACTATE AND CALCIUM CHLORIDE 200 ML/HR: 600; 310; 30; 20 INJECTION, SOLUTION INTRAVENOUS at 12:38

## 2024-02-04 RX ADMIN — HYDROCODONE BITARTRATE AND ACETAMINOPHEN 1 TABLET: 5; 325 TABLET ORAL at 09:41

## 2024-02-04 RX ADMIN — SODIUM CHLORIDE, POTASSIUM CHLORIDE, SODIUM LACTATE AND CALCIUM CHLORIDE 200 ML/HR: 600; 310; 30; 20 INJECTION, SOLUTION INTRAVENOUS at 07:54

## 2024-02-04 RX ADMIN — HYDROCODONE BITARTRATE AND ACETAMINOPHEN 1 TABLET: 5; 325 TABLET ORAL at 22:32

## 2024-02-04 RX ADMIN — CEFTRIAXONE 1000 MG: 1 INJECTION, POWDER, FOR SOLUTION INTRAMUSCULAR; INTRAVENOUS at 01:55

## 2024-02-04 RX ADMIN — HYDROCODONE BITARTRATE AND ACETAMINOPHEN 1 TABLET: 5; 325 TABLET ORAL at 15:46

## 2024-02-04 RX ADMIN — MORPHINE SULFATE 4 MG: 4 INJECTION, SOLUTION INTRAMUSCULAR; INTRAVENOUS at 05:04

## 2024-02-04 RX ADMIN — SODIUM CHLORIDE, POTASSIUM CHLORIDE, SODIUM LACTATE AND CALCIUM CHLORIDE 200 ML/HR: 600; 310; 30; 20 INJECTION, SOLUTION INTRAVENOUS at 01:55

## 2024-02-04 RX ADMIN — GADOTERIDOL 18 ML: 279.3 INJECTION, SOLUTION INTRAVENOUS at 14:17

## 2024-02-04 RX ADMIN — Medication 10 ML: at 08:41

## 2024-02-04 NOTE — PLAN OF CARE
Goal Outcome Evaluation:                   Patient has been better this morning than she was yesterday, she denies any N/V. She is having Bowel movements and passing gas. Patient denies any pain upon palpation of her abdomen and rates her overall pain a 2 out of 10. We are waiting for the patient to get her MRI to further evaluate the situation. Patient is resting comfortably at this time.

## 2024-02-04 NOTE — NURSING NOTE
Contacted the lab to come do patient labs that are needed before her MRI. Pt made aware that she is not have anything INCLUDING ice chips until after her MRI.

## 2024-02-04 NOTE — NURSING NOTE
Called MRI to see if they could do pt's MRCP message stated to call tech in if procedure was stat. Pt's orders are for route imaging.

## 2024-02-04 NOTE — NURSING NOTE
Pt taken off of continuous pulse ox per providers orders, due to pt not having morphine since 0500. Pt now a spot check.

## 2024-02-04 NOTE — NURSING NOTE
Contacted MRI for this patient regarding the STAT MRI order. Was told that they had one patient on the table and one more before they could get to this patient, stated they would be up as soon as they could.

## 2024-02-04 NOTE — PROGRESS NOTES
[facility] MEDICINE SERVICE  DAILY PROGRESS NOTE    Patient Name: Magaly Aggarwal  : 1985  MRN: [id number]  Primary Care Physician:  Tammi Ny MD  Date of admission: 2024  Date of service: 24      Subjective      Chief Complaint: Abdominal pain.    Patient Reports no abdominal pain currently.  No nausea vomiting.  Patient is feeling better.  No chest pain or shortness of breath.  Dizziness no lightheadedness.    ROS A 12 point review of system was done and was negative except as mentioned above      Objective      Vitals:   Temp:  [98.2 °F (36.8 °C)-99.3 °F (37.4 °C)] 99 °F (37.2 °C)  Heart Rate:  [] 100  Resp:  [15-18] 17  BP: (115-138)/(76-88) 138/83    Physical Exam  Exam conducted with a chaperone present.   Constitutional:       Appearance: Normal appearance.   HENT:      Head: Normocephalic and atraumatic.      Nose: Nose normal.      Mouth/Throat:      Mouth: Mucous membranes are moist.   Cardiovascular:      Rate and Rhythm: Normal rate.   Pulmonary:      Effort: Pulmonary effort is normal. No respiratory distress.      Breath sounds: Normal breath sounds. No stridor. No wheezing, rhonchi or rales.   Chest:      Chest wall: No tenderness.   Abdominal:      General: Abdomen is flat. There is no distension.      Palpations: Abdomen is soft. There is no mass.      Tenderness: There is no abdominal tenderness.   Neurological:      General: No focal deficit present.      Mental Status: She is alert.             Result Review    Result Review:  I have personally reviewed the results from the time of this admission to 2024 12:15 EST and agree with these findings:  [x]  Laboratory  []  Microbiology  [x]  Radiology  []  EKG/Telemetry   []  Cardiology/Vascular   []  Pathology  []  Old records  []  Other:            Assessment & Plan      Brief Patient Summary:  Magaly Aggarwal is a 38 y.o. female with no significant past medical history who presented  to [facility] on 2/2/2024 with complaints of abdominal pain..  Patient is 4 months postpartum.  Pregnancy was uncomplicated except for high blood pressure on the day of delivery.  Denies any history of pancreatitis.  Denies any history of alcohol use or having gallstones.  Denies any fever or chills.     In the ED, blood pressure 182/116.  Labs remarkable for WBC 15.6, , 6 , ALT 1073, T. bili 2.8, lipase greater than 3000.  CT abdomen shows findings suggestive of acute pancreatitis.  There is a 1 cm stone in the dependent aspect of the gallbladder.         cefTRIAXone, 1,000 mg, Intravenous, Q24H  enoxaparin, 40 mg, Subcutaneous, Q24H  senna-docusate sodium, 2 tablet, Oral, BID  sodium chloride, 10 mL, Intravenous, Q12H       lactated ringers, 200 mL/hr, Last Rate: 200 mL/hr (02/04/24 0754)         Active Hospital Problems:  Active Hospital Problems    Diagnosis     **Acute pancreatitis      Plan:   Abdominal pain  Acute pancreatitis  Cholelithiasis  Leukocytosis  Uterine fibroids      Will treat the patient with IV fluids and analgesics as needed.  Keep patient n.p.o..  Patient is abdominal pain is improved.  LFT has improved.  Lipase is improved.  Follow-up electrolytes.  Ultrasound shows Fluid surrounding an enlarged pancreas suggestive of acute pancreatitis. Correlation with pancreatic enzyme levels would be recommended.  2.1 cm stone in the dependent aspect of the gallbladder with trace pericholecystic fluid.  GI is consulted.  For MRI today.  Ultrasound shows uterine fibroids.  Outpatient GYN evaluation and management to be arranged by PCP.  Pain control and antiemetics     Patient was updated with plan of care.  All questions answered  DVT prophylaxis:  Medical DVT prophylaxis orders are present.        CODE STATUS:    Code Status (Patient has no pulse and is not breathing): CPR (Attempt to Resuscitate)  Medical Interventions (Patient has pulse or is breathing): Full  Support      Disposition:  I expect patient to be discharged 1 to 2 days        Electronically signed by Bal Prajapati MD, 02/04/24, 12:15 EST.  [facility] Hospitalist Team

## 2024-02-04 NOTE — PROGRESS NOTES
" LOS: 1 day   Patient Care Team:  Tammi Ny MD as PCP - General (Family Medicine)      Subjective   \"Feeling a little hungry but flako afraid to eat\"    Interval History:   Pending morning labs, spoke to bedside nurse to contact phlebotomist.   Pending MRCP, have changed to STAT since no MRI tech working this weekend, only on call.   Lipase came down to 1986 yesterday.  Triglycerides were normal at 65.    ROS:   Abdominal pain improving   No chest pain, shortness of breath, or cough.         Medication Review:     Current Facility-Administered Medications:     sennosides-docusate (PERICOLACE) 8.6-50 MG per tablet 2 tablet, 2 tablet, Oral, BID **AND** polyethylene glycol (MIRALAX) packet 17 g, 17 g, Oral, Daily PRN **AND** bisacodyl (DULCOLAX) EC tablet 5 mg, 5 mg, Oral, Daily PRN **AND** bisacodyl (DULCOLAX) suppository 10 mg, 10 mg, Rectal, Daily PRN, Mine Cifuentes MD    cefTRIAXone (ROCEPHIN) 1,000 mg in sodium chloride 0.9 % 100 mL IVPB, 1,000 mg, Intravenous, Q24H, Mine Cifuentes MD, Last Rate: 200 mL/hr at 02/04/24 0155, 1,000 mg at 02/04/24 0155    Enoxaparin Sodium (LOVENOX) syringe 40 mg, 40 mg, Subcutaneous, Q24H, Mine Cifuentes MD, 40 mg at 02/03/24 1702    lactated ringers infusion, 200 mL/hr, Intravenous, Continuous, Drea Brown APRN, Last Rate: 200 mL/hr at 02/04/24 0754, 200 mL/hr at 02/04/24 0754    morphine injection 4 mg, 4 mg, Intravenous, Q4H PRN, Mine Cifuentes MD, 4 mg at 02/04/24 0504    ondansetron (ZOFRAN) injection 4 mg, 4 mg, Intravenous, Q6H PRN, Mine Cifuentes MD, 4 mg at 02/03/24 5128    [COMPLETED] Insert Peripheral IV, , , Once **AND** sodium chloride 0.9 % flush 10 mL, 10 mL, Intravenous, PRN, Red Arias MD    sodium chloride 0.9 % flush 10 mL, 10 mL, Intravenous, Q12H, Mine Cifuentes MD, 10 mL at 02/03/24 0844    sodium chloride 0.9 % flush 10 mL, 10 mL, Intravenous, PRN, Mine Cifuentes MD    sodium chloride 0.9 % infusion 40 mL, 40 " "mL, Intravenous, PRN, Mine Cifuentes MD      Objective resting in hospital bed breast-feeding.  NAD.  No family present.    Vital Signs  Temp:  [97.3 °F (36.3 °C)-99.3 °F (37.4 °C)] 98.2 °F (36.8 °C)  Heart Rate:  [69-98] 98  Resp:  [15-20] 18  BP: (115-137)/(76-87) 137/87  Physical Exam:    General Appearance:    Awake and alert, in no acute distress   Head:    Normocephalic, without obvious abnormality   Eyes:          Conjunctivae normal, anicteric sclerae   Ears:    Hearing intact   Throat:   No oral lesions, no thrush, oral mucosa moist   Neck:   No adenopathy, supple, no JVD   Lungs:      respirations regular, even and unlabored        Abdomen:     Normal bowel sounds, soft, tender, no rebound or guarding, non-distended, no hepatosplenomegaly   Rectal:     Deferred   Extremities:   No edema, no cyanosis, no redness   Skin:   No bleeding, bruising or rash, no jaundice   Neurologic:   Cranial nerves 2 - 12 grossly intact, no asterixis, sensation   intact        Results Review:    CBC    Results from last 7 days   Lab Units 02/03/24  0531 02/02/24  2351   WBC 10*3/mm3 10.40 15.60*   HEMOGLOBIN g/dL 14.9 15.1   PLATELETS 10*3/mm3 291 334     CMP   Results from last 7 days   Lab Units 02/03/24  0717 02/02/24  2351   SODIUM mmol/L 142 140   POTASSIUM mmol/L 3.8 3.9   CHLORIDE mmol/L 107 102   CO2 mmol/L 20.0* 21.0*   BUN mg/dL 10 13   CREATININE mg/dL 0.62 0.67   GLUCOSE mg/dL 108* 127*   ALBUMIN g/dL 4.2 4.8   BILIRUBIN mg/dL 1.3* 2.8*   ALK PHOS U/L 405* 487*   AST (SGOT) U/L 387* 759*   ALT (SGPT) U/L 821* 1,073*   LIPASE U/L 1,986* >3,000*     Cr Clearance Estimated Creatinine Clearance: 158.9 mL/min (by C-G formula based on SCr of 0.62 mg/dL).  Coag     HbA1C   Lab Results   Component Value Date    HGBA1C 4.8 05/02/2023     Blood Glucose No results found for: \"POCGLU\"  Infection   Results from last 7 days   Lab Units 02/03/24  0217 02/03/24  0207   BLOODCX  No growth at 24 hours Abnormal Stain*   BCIDPCR   " --  Staph spp, not aureus or lugdunensis. Identification by BCID2 PCR.*     UA    Results from last 7 days   Lab Units 02/03/24  0110   NITRITE UA  Negative     Radiology(recent) CT Abdomen Pelvis With Contrast    Result Date: 2/3/2024  Impression: 1.Fluid surrounding an enlarged pancreas suggestive of acute pancreatitis. Correlation with pancreatic enzyme levels would be recommended. 2.1 cm stone in the dependent aspect of the gallbladder with trace pericholecystic fluid. 3.Moderate stool burden suggest constipation. 4.Multiple uterine fibroids some of which are centrally low density and peripherally calcified likely necrotic. Electronically Signed: Dillan Tran MD  2/3/2024 1:04 AM EST  Workstation ID: [id number]           Assessment & Plan   Pancreatitis most likely related to gallstones  Abdominal pain related to above  Gallstone  Postpartum x 4 months  Constipation  Elevated LFTs related to pancreatitis  Left lower extremity DVT off anticoagulation  History of SVT off metoprolol x 1 month  Breast-feeding    PLAN:  MRCP ordered yesterday but not done.  Evidently MRI is just on-call today and will only come in for stat so order has been changed to stat.  Pending morning labs to be drawn by phlebotomist.  Keep n.p.o. until after MRI is done.  Instructed if MRI is normal we will try some clear liquids and see how she does.  Discussed with bedside RN.  Patient states her pain is a 1 and does not feel like she needs any morphine.  Discussed that I will order her a pain pill and she may want to try that when she needs something for pain.  Continue IV fluids for hydration.  Antiemetics and pain medication as needed.       Drea Brown, BOAR  02/04/24  08:30 EST

## 2024-02-04 NOTE — PLAN OF CARE
Goal Outcome Evaluation:  Plan of Care Reviewed With: patient        Progress: no change Pt still feels the same, she has constant epi gastric pain. Abd is soft she is passing gas and loose stool. Pain is being controlled with Morphine.  She is still NPO while with ice chips. IVF are still LR @ 200cc hr. Pt has breast milk in the frig.

## 2024-02-05 LAB
ALBUMIN SERPL-MCNC: 3.9 G/DL (ref 3.5–5.2)
ALBUMIN/GLOB SERPL: 1.5 G/DL
ALP SERPL-CCNC: 318 U/L (ref 39–117)
ALT SERPL W P-5'-P-CCNC: 270 U/L (ref 1–33)
ANION GAP SERPL CALCULATED.3IONS-SCNC: 10 MMOL/L (ref 5–15)
AST SERPL-CCNC: 39 U/L (ref 1–32)
BACTERIA SPEC AEROBE CULT: ABNORMAL
BASOPHILS # BLD AUTO: 0 10*3/MM3 (ref 0–0.2)
BASOPHILS NFR BLD AUTO: 0.4 % (ref 0–1.5)
BILIRUB SERPL-MCNC: 0.6 MG/DL (ref 0–1.2)
BUN SERPL-MCNC: 4 MG/DL (ref 6–20)
BUN/CREAT SERPL: 8 (ref 7–25)
CALCIUM SPEC-SCNC: 8.9 MG/DL (ref 8.6–10.5)
CHLORIDE SERPL-SCNC: 98 MMOL/L (ref 98–107)
CO2 SERPL-SCNC: 24 MMOL/L (ref 22–29)
CREAT SERPL-MCNC: 0.5 MG/DL (ref 0.57–1)
DEPRECATED RDW RBC AUTO: 42.9 FL (ref 37–54)
EGFRCR SERPLBLD CKD-EPI 2021: 124.1 ML/MIN/1.73
EOSINOPHIL # BLD AUTO: 0.1 10*3/MM3 (ref 0–0.4)
EOSINOPHIL NFR BLD AUTO: 1 % (ref 0.3–6.2)
ERYTHROCYTE [DISTWIDTH] IN BLOOD BY AUTOMATED COUNT: 13.9 % (ref 12.3–15.4)
GLOBULIN UR ELPH-MCNC: 2.6 GM/DL
GLUCOSE SERPL-MCNC: 123 MG/DL (ref 65–99)
GRAM STN SPEC: ABNORMAL
HCT VFR BLD AUTO: 38.2 % (ref 34–46.6)
HGB BLD-MCNC: 12.5 G/DL (ref 12–15.9)
ISOLATED FROM: ABNORMAL
LIPASE SERPL-CCNC: 25 U/L (ref 13–60)
LYMPHOCYTES # BLD AUTO: 0.9 10*3/MM3 (ref 0.7–3.1)
LYMPHOCYTES NFR BLD AUTO: 8 % (ref 19.6–45.3)
MCH RBC QN AUTO: 27.5 PG (ref 26.6–33)
MCHC RBC AUTO-ENTMCNC: 32.8 G/DL (ref 31.5–35.7)
MCV RBC AUTO: 84 FL (ref 79–97)
MONOCYTES # BLD AUTO: 0.8 10*3/MM3 (ref 0.1–0.9)
MONOCYTES NFR BLD AUTO: 7.7 % (ref 5–12)
NEUTROPHILS NFR BLD AUTO: 8.9 10*3/MM3 (ref 1.7–7)
NEUTROPHILS NFR BLD AUTO: 82.9 % (ref 42.7–76)
NRBC BLD AUTO-RTO: 0 /100 WBC (ref 0–0.2)
PLATELET # BLD AUTO: 261 10*3/MM3 (ref 140–450)
PMV BLD AUTO: 8.8 FL (ref 6–12)
POTASSIUM SERPL-SCNC: 3.2 MMOL/L (ref 3.5–5.2)
PROT SERPL-MCNC: 6.5 G/DL (ref 6–8.5)
RBC # BLD AUTO: 4.55 10*6/MM3 (ref 3.77–5.28)
SODIUM SERPL-SCNC: 132 MMOL/L (ref 136–145)
WBC NRBC COR # BLD AUTO: 10.7 10*3/MM3 (ref 3.4–10.8)

## 2024-02-05 PROCEDURE — 99222 1ST HOSP IP/OBS MODERATE 55: CPT | Performed by: SURGERY

## 2024-02-05 PROCEDURE — 80053 COMPREHEN METABOLIC PANEL: CPT | Performed by: INTERNAL MEDICINE

## 2024-02-05 PROCEDURE — 85025 COMPLETE CBC W/AUTO DIFF WBC: CPT | Performed by: INTERNAL MEDICINE

## 2024-02-05 PROCEDURE — 83690 ASSAY OF LIPASE: CPT | Performed by: INTERNAL MEDICINE

## 2024-02-05 PROCEDURE — 25810000003 LACTATED RINGERS SOLUTION: Performed by: STUDENT IN AN ORGANIZED HEALTH CARE EDUCATION/TRAINING PROGRAM

## 2024-02-05 PROCEDURE — 25810000003 LACTATED RINGERS PER 1000 ML: Performed by: STUDENT IN AN ORGANIZED HEALTH CARE EDUCATION/TRAINING PROGRAM

## 2024-02-05 RX ORDER — POTASSIUM CHLORIDE 20 MEQ/1
40 TABLET, EXTENDED RELEASE ORAL DAILY
Status: DISCONTINUED | OUTPATIENT
Start: 2024-02-05 | End: 2024-02-07 | Stop reason: HOSPADM

## 2024-02-05 RX ORDER — SODIUM CHLORIDE, SODIUM LACTATE, POTASSIUM CHLORIDE, CALCIUM CHLORIDE 600; 310; 30; 20 MG/100ML; MG/100ML; MG/100ML; MG/100ML
250 INJECTION, SOLUTION INTRAVENOUS CONTINUOUS
Status: DISPENSED | OUTPATIENT
Start: 2024-02-05 | End: 2024-02-06

## 2024-02-05 RX ADMIN — SODIUM CHLORIDE, POTASSIUM CHLORIDE, SODIUM LACTATE AND CALCIUM CHLORIDE 1000 ML: 600; 310; 30; 20 INJECTION, SOLUTION INTRAVENOUS at 18:34

## 2024-02-05 RX ADMIN — HYDROCODONE BITARTRATE AND ACETAMINOPHEN 1 TABLET: 5; 325 TABLET ORAL at 15:41

## 2024-02-05 RX ADMIN — HYDROCODONE BITARTRATE AND ACETAMINOPHEN 1 TABLET: 5; 325 TABLET ORAL at 05:29

## 2024-02-05 RX ADMIN — SODIUM CHLORIDE, POTASSIUM CHLORIDE, SODIUM LACTATE AND CALCIUM CHLORIDE 1000 ML: 600; 310; 30; 20 INJECTION, SOLUTION INTRAVENOUS at 14:11

## 2024-02-05 RX ADMIN — POTASSIUM CHLORIDE 40 MEQ: 1500 TABLET, EXTENDED RELEASE ORAL at 13:58

## 2024-02-05 RX ADMIN — SODIUM CHLORIDE, POTASSIUM CHLORIDE, SODIUM LACTATE AND CALCIUM CHLORIDE 250 ML/HR: 600; 310; 30; 20 INJECTION, SOLUTION INTRAVENOUS at 19:48

## 2024-02-05 RX ADMIN — HYDROCODONE BITARTRATE AND ACETAMINOPHEN 1 TABLET: 5; 325 TABLET ORAL at 22:50

## 2024-02-05 NOTE — CONSULTS
Inpatient General Surgery Consult  Consult performed by: Jabier Benson MD  Consult ordered by: Drea Brown APRN  Reason for consult: Gallstone pancreatitis          General Surgery Consult Note      Name: Magaly Aggarwal ADMIT: 2024   : 1985  PCP: Tammi Ny MD    MRN: [id number] LOS: 2 days   AGE/SEX: 38 y.o. 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  She is feeling better the pain is improving.  She has been tolerating some diet.  She started to have bowel function is on the looser side.  LFTs initially were elevated and those are improving.  She did have an MRI which did not show any evidence of choledocholithiasis.  But showed cholelithiasis.    History reviewed. No pertinent past medical history.  History reviewed. No pertinent surgical history.  History reviewed. No pertinent family history.    Social History     Tobacco Use    Smoking status: Never     Passive exposure: Never    Smokeless tobacco: Never   Vaping Use    Vaping Use: Never used   Substance Use Topics    Alcohol use: Defer    Drug use: Never     Medications Prior to Admission   Medication Sig Dispense Refill Last Dose    Etonogestrel (NEXPLANON) 68 MG implant subdermal implant Inject 1 each into the appropriate area of the skin as directed by provider 1 (One) Time.        enoxaparin, 40 mg, Subcutaneous, Q24H  senna-docusate sodium, 2 tablet, Oral, BID           senna-docusate sodium **AND** polyethylene glycol **AND** bisacodyl **AND** bisacodyl    HYDROcodone-acetaminophen    Morphine    ondansetron    sodium  chloride  Patient has no known allergies.    Review of Systems   Constitutional:  Negative for chills and fever.   HENT:  Negative for sore throat and trouble swallowing.    Eyes:  Negative for blurred vision and double vision.   Respiratory:  Negative for cough and shortness of breath.    Cardiovascular:  Negative for chest pain and leg swelling.   Gastrointestinal:  Positive for abdominal pain, diarrhea, nausea and vomiting. Negative for abdominal distention, blood in stool and constipation.   Genitourinary:  Negative for dysuria and hematuria.   Neurological:  Negative for dizziness and confusion.        Objective     Vital Signs and Labs:  Vital Signs Patient Vitals for the past 24 hrs:   BP Temp Temp src Pulse Resp SpO2   02/05/24 0900 125/77 99.1 °F (37.3 °C) Oral 120 16 97 %   02/05/24 0326 118/79 99.9 °F (37.7 °C) Oral 117 16 95 %   02/04/24 2342 129/83 98 °F (36.7 °C) Oral 113 16 94 %   02/04/24 1942 134/97 -- -- 92 18 100 %   02/04/24 1551 127/76 97.7 °F (36.5 °C) Oral 110 -- 99 %       Physical Exam:  Physical Exam  Constitutional:       Appearance: Normal appearance.   HENT:      Head: Normocephalic and atraumatic.   Eyes:      General: No scleral icterus.     Conjunctiva/sclera: Conjunctivae normal.   Cardiovascular:      Rate and Rhythm: Tachycardia present.   Pulmonary:      Effort: Pulmonary effort is normal. No respiratory distress.   Abdominal:      General: There is no distension.      Palpations: Abdomen is soft.   Skin:     General: Skin is warm and dry.   Neurological:      General: No focal deficit present.      Mental Status: She is alert. Mental status is at baseline.   Psychiatric:         Mood and Affect: Mood normal.         Behavior: Behavior normal.         CBC    Results from last 7 days   Lab Units 02/05/24  0635 02/04/24  0904 02/03/24  0531 02/02/24  2351   WBC 10*3/mm3 10.70 9.50 10.40 15.60*   HEMOGLOBIN g/dL 12.5 13.0 14.9 15.1   PLATELETS 10*3/mm3 261 264 291 334     CMP    Results from last 7 days   Lab Units 02/05/24  0635 02/04/24  0904 02/03/24  0717 02/02/24  2351   SODIUM mmol/L 132* 138 142 140   POTASSIUM mmol/L 3.2* 3.6 3.8 3.9   CHLORIDE mmol/L 98 101 107 102   CO2 mmol/L 24.0 21.0* 20.0* 21.0*   BUN mg/dL 4* 7 10 13   CREATININE mg/dL 0.50* 0.49* 0.62 0.67   GLUCOSE mg/dL 123* 70 108* 127*   ALBUMIN g/dL 3.9 4.0 4.2 4.8   BILIRUBIN mg/dL 0.6 0.8 1.3* 2.8*   ALK PHOS U/L 318* 334* 405* 487*   AST (SGOT) U/L 39* 68* 387* 759*   ALT (SGPT) U/L 270* 409* 821* 1,073*   LIPASE U/L 25 94* 1,986* >3,000*     Radiology(recent) MRI abdomen w wo contrast mrcp    Result Date: 2/4/2024  Impression: 1. Relatively mild peripancreatic edema at the level of the pancreatic tail and adjacent pancreatic body consistent with pancreatitis and similar to previous CT scan. 2. Multiple small gallstones. No visible gallbladder inflammation. 3. Common hepatic duct and proximal common bile duct at upper limits of normal diameter. No visible common duct stone. Electronically Signed: Amaury Valadez MD  2/4/2024 2:37 PM EST  Workstation ID: [id number]     I reviewed the patient's new clinical results.  I reviewed the patient's new imaging results and agree with the interpretation.    Assessment & Plan       Acute pancreatitis      38 y.o. female admitted with acute pancreatitis likely gallstone pancreatitis.    Talked her about my role in her care.  We talked about the role of cholecystectomy for gallstone pancreatitis.  Talked at the indications for surgery the steps of the operation anticipated recovery.  Talked with possible complications like bleeding infection incidental injury.  We talked about open surgery and bile duct injury as well.  We talked about life not a gallbladder.  After our discussion about cholecystectomy she initially was hoping to be able to get this done as an interval cholecystectomy which I would be okay with but I also would be willing to take her gallbladder out tomorrow around  noon.  She is going to talk about with her  and make final decision but tentatively will schedule the cholecystectomy with intraoperative cholangiogram for tomorrow.       This note was created using Dragon Voice Recognition software.    Jabier Benson MD  02/05/24  12:02 EST

## 2024-02-05 NOTE — PLAN OF CARE
Goal Outcome Evaluation:        Problem: Adult Inpatient Plan of Care  Goal: Plan of Care Review  Outcome: Ongoing, Progressing  Goal: Patient-Specific Goal (Individualized)  Outcome: Ongoing, Progressing  Goal: Absence of Hospital-Acquired Illness or Injury  Outcome: Ongoing, Progressing  Intervention: Identify and Manage Fall Risk  Description: Perform standard risk assessment on admission using a validated tool or comprehensive approach appropriate to the patient; reassess fall risk frequently, with change in status or transfer to another level of care.  Communicate fall injury risk to interprofessional healthcare team.  Determine need for increased observation, equipment and environmental modification, such as low bed, signage and supportive, nonskid footwear.  Adjust safety measures to individual developmental age, stage and identified risk factors.  Reinforce the importance of safety and physical activity with patient and family.  Perform regular intentional rounding to assess need for position change, pain assessment and personal needs, including assistance with toileting.  Recent Flowsheet Documentation  Taken 2/5/2024 0636 by Julia Clancy RN  Safety Promotion/Fall Prevention: safety round/check completed  Taken 2/5/2024 0238 by Julia Clancy RN  Safety Promotion/Fall Prevention: safety round/check completed  Taken 2/5/2024 0148 by Julia Clancy RN  Safety Promotion/Fall Prevention: safety round/check completed  Taken 2/4/2024 2332 by Julia Clancy RN  Safety Promotion/Fall Prevention: safety round/check completed  Taken 2/4/2024 2245 by Julia Clancy RN  Safety Promotion/Fall Prevention: safety round/check completed  Intervention: Prevent and Manage VTE (Venous Thromboembolism) Risk  Description: Assess for VTE (venous thromboembolism) risk.  Encourage and assist with early ambulation.  Initiate and maintain compression or other therapy, as indicated, based on identified risk in  accordance with organizational protocol and provider order.  Encourage both active and passive leg exercises while in bed, if unable to ambulate.  Recent Flowsheet Documentation  Taken 2/5/2024 0238 by Julia Clancy RN  Activity Management: ambulated to bathroom  Goal: Optimal Comfort and Wellbeing  Outcome: Ongoing, Progressing  Intervention: Monitor Pain and Promote Comfort  Description: Assess pain level, treatment efficacy and patient response at regular intervals using a consistent pain scale.  Consider the presence and impact of preexisting chronic pain.  Encourage patient and caregiver involvement in pain assessment, interventions and safety measures.  Recent Flowsheet Documentation  Taken 2/4/2024 2232 by Julia Clancy RN  Pain Management Interventions: see MAR  Goal: Readiness for Transition of Care  Outcome: Ongoing, Progressing     Problem: Fluid Imbalance (Pancreatitis)  Goal: Fluid Balance  Outcome: Ongoing, Progressing     Problem: Infection (Pancreatitis)  Goal: Infection Symptom Resolution  Outcome: Ongoing, Progressing     Problem: Nutrition Impaired (Pancreatitis)  Goal: Optimal Nutrition Intake  Outcome: Ongoing, Progressing     Problem: Pain (Pancreatitis)  Goal: Acceptable Pain Control  Outcome: Ongoing, Progressing  Intervention: Monitor and Manage Pain  Description: Set pain management goals; mutually determine pain management plan and review plan regularly.  Use a consistent, validated tool for pain assessment, including function and quality of life; evaluate pain level, effect of treatment and patient’s response at regular intervals.##  Consider the presence and impact of pre-existing chronic pain.  Encourage patient and family/caregiver involvement in pain assessment, interventions and safety measures.  Match pharmacologic analgesia to severity and type of pain mechanism; evaluate risk for opioid use and dependence; consider multimodal approach and titrate to patient  response.  Anticipate use of PCA (patient-controlled analgesia) or epidural analgesia for severe or uncontrolled pain.  Manage medication-induced effects, such as constipation, nausea, urinary retention, somnolence and dizziness.  Modify pain perception using techniques, such as distraction, mindfulness, guided imagery, meditation or music.  Consider and address emotional response to pain.  Recent Flowsheet Documentation  Taken 2/4/2024 2232 by Julia Clancy RN  Pain Management Interventions: see MAR     Problem: Respiratory Compromise (Pancreatitis)  Goal: Effective Oxygenation and Ventilation  Outcome: Ongoing, Progressing  Intervention: Optimize Oxygenation and Ventilation  Description: Assess and monitor airway, breathing and circulation; maintain close surveillance for deterioration.  Maintain patent airway; position to minimize risk of obstruction, aspiration and ventilation/perfusion mismatch.  Promote early mobility or ambulation; match activity to ability and tolerance.  Encourage pulmonary hygiene and lung expansion therapy, such as deep breathing, cough, suction, positive airway pressure to minimize respiratory complication risk  Provide oxygen therapy judiciously to avoid hyperoxemia; adjust to achieve oxygenation goal.  Monitor fluid balance closely to minimize the risk of fluid overload.  Anticipate the need for intubation and mechanical ventilation for airway protection and respiratory support.  Recent Flowsheet Documentation  Taken 2/5/2024 0238 by Julia Clancy, RN  Activity Management: ambulated to bathroom     Problem: Pain Acute  Goal: Acceptable Pain Control and Functional Ability  Outcome: Ongoing, Progressing  Intervention: Develop Pain Management Plan  Description: Acknowledge patient as the expert in pain self-management.  Use a consistent, validated tool for pain assessment; include function and quality of life.  Evaluate risk for opioid use and dependence.  Set pain management  goals; determine acceptable level of discomfort to allow for maximal functioning.  Determine [id number] pain management plan, including both pharmacologic and nonpharmacologic measures; integrate management of chronic (persistent) pain.  Identify and integrate past successful treatment measures, if able.  Encourage patient and caregiver involvement in pain assessment, interventions and safety measures.  Re-evaluate plan regularly.  Recent Flowsheet Documentation  Taken 2/4/2024 2232 by Julia Clancy RN  Pain Management Interventions: see MAR

## 2024-02-05 NOTE — PAYOR COMM NOTE
"Magaly Mason (38 y.o. Female)       Date of Birth   1985    Social Security Number       Address   [address] [place] [facility]    Home Phone   [phone]    MRN   [id number]       Buddhism   Yarsanism    Marital Status                               Admission Date   24    Admission Type   Emergency    Admitting Provider   Mine Cifuentes MD    Attending Provider   Alvarez, Carlos Llanes, MD    Department, Room/Bed   [facility], M406/       Discharge Date       Discharge Disposition       Discharge Destination                                 Attending Provider: Alvarez, Carlos Llanes, MD    Allergies: No Known Allergies    Isolation: None   Infection: None   Code Status: CPR    Ht: 177.8 cm (70\")   Wt: 81.6 kg (180 lb)    Admission Cmt: None   Principal Problem: Acute pancreatitis [K85.90]                   Active Insurance as of 2024       Primary Coverage       Payor Plan Insurance Group Employer/Plan Group    [employer] BLUE [employer]OSS [employer] BLUE CROSS BLUE [employer]IELD PPO [id number]       Payor Plan Address Payor Plan Phone Number Payor Plan Fax Number Effective Dates    [address] [phone]  2023 - None Entered    [place] [address]         Subscriber Name Subscriber Birth Date Member ID       MAGALY MASON 1985 [id number]                     Emergency Contacts        (Rel.) Home Phone Work Phone Mobile Phone    AJ MASON (Spouse) -- -- [phone]                 History & Physical        Mine Cifuentes MD at 24 [address] [place] [address] Medicine Services  History & Physical    Patient Name: Magaly Mason  : 1985  MRN: [id number]  Primary Care Physician:  Tammi Ny MD  Date of admission: 2024  Date and Time of Service: 2024 at 02:12 EST      Subjective      Chief Complaint: Abdominal pain    History of Present Illness: Magaly Galvez" Jaesn is a 38 y.o. female with no significant past medical history who presented to [facility] on 2/2/2024 with complaints of abdominal pain.  Per patient, she has been having abdominal pain for the past 1 week.  Stated that she felt bloated.  Abdominal pain is mainly in the epigastric region.  She had some nausea as well as vomiting in the past couple of days. Of note, patient is 4 months postpartum.  Pregnancy was uncomplicated except for high blood pressure on the day of delivery.  Denies any history of pancreatitis.  Denies any history of alcohol use or having gallstones.  Denies any fever or chills.    In the ED, blood pressure 182/116.  Labs remarkable for WBC 15.6, , 6 , ALT 1073, T. bili 2.8, lipase greater than 3000.  CT abdomen shows findings suggestive of acute pancreatitis.  There is a 1 cm stone in the dependent aspect of the gallbladder.    Review of Systems  As above  Personal History     No past medical history on file.    No past surgical history on file.    Family History: family history is not on file. Otherwise pertinent FHx was reviewed and not pertinent to current issue.    Social History:      Home Medications:  Prior to Admission Medications       None              Allergies:  No Known Allergies    Objective      Vitals:   Temp:  [97.5 °F (36.4 °C)] 97.5 °F (36.4 °C)  Heart Rate:  [62-86] 62  Resp:  [17] 17  BP: (146-182)/() 146/81  Body mass index is 25.83 kg/m².  Physical Exam  General Appearance: AOO x 4, cooperative, no distress, appropriate for age  Head:  Normocephalic, without obvious abnormality  Eyes:  PERRL, EOM's intact, conjunctivae and cornea clear  Nose:  Nares symmetrical, septum midline, mucosa pink  Throat:  Lips, tongue, and mucosa are moist, pink, and intact  Neck:  Supple; symmetrical, trachea midline, no adenopathy  Back:  Symmetrical, ROM normal, no CVA tenderness  Lungs: Respirations unlabored, no audible wheeze  Heart: Regular rate &  rhythm, S1 and S2 normal  Abdomen: Tender to palpation in the epigastric region.    Musculoskeletal: Tone and strength strong and symmetrical, all extremities; no joint pain or edema         Skin/Hair/Nails:  Skin warm, dry and intact, no rashes or abnormal dyspigmentation       Diagnostic Data:  Lab Results (last 24 hours)       Procedure Component Value Units Date/Time    Urinalysis With Culture If Indicated - Urine, Clean Catch [[id number]]  (Abnormal) Collected: 02/03/24 0110    Specimen: Urine, Clean Catch Updated: 02/03/24 0117     Color, UA Dark Yellow     Appearance, UA Clear     pH, UA <=5.0     Specific Gravity, UA 1.017     Glucose, UA Negative     Ketones, UA Negative     Bilirubin, UA Negative     Blood, UA Negative     Protein, UA Negative     Leuk Esterase, UA Negative     Nitrite, UA Negative     Urobilinogen, UA 0.2 E.U./dL    Narrative:      In absence of clinical symptoms, the presence of pyuria, bacteria, and/or nitrites on the urinalysis result does not correlate with infection.  Urine microscopic not indicated.    Lipase [[id number]]  (Abnormal) Collected: 02/02/24 2351    Specimen: Blood Updated: 02/03/24 0101     Lipase >3,000 U/L     Comprehensive Metabolic Panel [[id number]]  (Abnormal) Collected: 02/02/24 2351    Specimen: Blood Updated: 02/03/24 0101     Glucose 127 mg/dL      BUN 13 mg/dL      Creatinine 0.67 mg/dL      Sodium 140 mmol/L      Potassium 3.9 mmol/L      Comment: Slight hemolysis detected by analyzer. Result may be falsely elevated.        Chloride 102 mmol/L      CO2 21.0 mmol/L      Calcium 9.6 mg/dL      Total Protein 7.2 g/dL      Albumin 4.8 g/dL      ALT (SGPT) 1,073 U/L      AST (SGOT) 759 U/L      Comment: Slight hemolysis detected by analyzer. Result may be falsely elevated.        Alkaline Phosphatase 487 U/L      Total Bilirubin 2.8 mg/dL      Globulin 2.4 gm/dL      A/G Ratio 2.0 g/dL      BUN/Creatinine Ratio 19.4     Anion Gap 17.0 mmol/L      eGFR 115.6  mL/min/1.73     Narrative:      GFR Normal >60  Chronic Kidney Disease <60  Kidney Failure <15      hCG, Serum, Qualitative [[id number]]  (Normal) Collected: 02/02/24 2351    Specimen: Blood Updated: 02/03/24 0012     HCG Qualitative Negative    CBC & Differential [[id number]]  (Abnormal) Collected: 02/02/24 2351    Specimen: Blood Updated: 02/03/24 0000    Narrative:      The following orders were created for panel order CBC & Differential.  Procedure                               Abnormality         Status                     ---------                               -----------         ------                     CBC Auto Differential[[id number]]        Abnormal            Final result                 Please view results for these tests on the individual orders.    CBC Auto Differential [[id number]]  (Abnormal) Collected: 02/02/24 2351    Specimen: Blood Updated: 02/03/24 0000     WBC 15.60 10*3/mm3      RBC 5.52 10*6/mm3      Hemoglobin 15.1 g/dL      Hematocrit 47.7 %      MCV 86.4 fL      MCH 27.4 pg      MCHC 31.7 g/dL      RDW 14.0 %      RDW-SD 42.4 fl      MPV 8.9 fL      Platelets 334 10*3/mm3      Neutrophil % 88.6 %      Lymphocyte % 5.5 %      Monocyte % 5.0 %      Eosinophil % 0.5 %      Basophil % 0.4 %      Neutrophils, Absolute 13.80 10*3/mm3      Lymphocytes, Absolute 0.90 10*3/mm3      Monocytes, Absolute 0.80 10*3/mm3      Eosinophils, Absolute 0.10 10*3/mm3      Basophils, Absolute 0.10 10*3/mm3      nRBC 0.0 /100 WBC     POC Lactate [[id number]]  (Normal) Collected: 02/02/24 2354    Specimen: Blood Updated: 02/02/24 2355     Lactate 1.6 mmol/L      Comment: Serial Number: [id number]:  [id number]                Imaging Results (Last 24 Hours)       Procedure Component Value Units Date/Time    CT Abdomen Pelvis With Contrast [[id number]] Collected: 02/03/24 0058     Updated: 02/03/24 0106    Narrative:      CT ABDOMEN PELVIS W CONTRAST    Date of Exam: 2/3/2024 12:40 AM EST    Indication:  pain.    Comparison: None available.    Technique: Axial CT images were obtained of the abdomen and pelvis following the uneventful intravenous administration of iodinated contrast. Sagittal and coronal reconstructions were performed.  Automated exposure control and iterative reconstruction   methods were used.    Findings:  There is bilateral basilar atelectasis.    There is a small stone in the dependent aspect of the gallbladder. There is trace pericholecystic fluid.    The bilateral adrenal glands, bilateral kidneys and spleen appear normal.    There is enlargement of the pancreatic body and tail with surrounding peripancreatic fluid most likely secondary to acute pancreatitis.    There is a moderate stool burden with stool to the cecum. There is no inflammatory change of the large or small bowel. There is an appendicolith but no inflammatory change of the appendix. There is a small periumbilical hernia containing only fat.    There are multiple partially calcified lobular structures of the uterus consistent with fibroid disease. The urinary bladder is unremarkable. The osseous structures are normal for age.        Impression:      Impression:  1.Fluid surrounding an enlarged pancreas suggestive of acute pancreatitis. Correlation with pancreatic enzyme levels would be recommended.  2.1 cm stone in the dependent aspect of the gallbladder with trace pericholecystic fluid.  3.Moderate stool burden suggest constipation.  4.Multiple uterine fibroids some of which are centrally low density and peripherally calcified likely necrotic.            Electronically Signed: Dillan Tran MD    2/3/2024 1:04 AM EST    Workstation ID: [id number]              Assessment & Plan        This is a 38 y.o. female with PMH of    Active and Resolved Problems  Active Hospital Problems    Diagnosis  POA    **Acute pancreatitis [K85.90]  Yes      Resolved Hospital Problems   No resolved problems to display.       Acute  "pancreatitis  Cholelithiasis  Abdominal pain  Leukocytosis  Keep patient n.p.o.  Will start LR at 150 cc/h  Consult GI  Empiric coverage with Rocephin  Repeat lipase, CBC and CMP in the morning  Obtain lipid panel to check for triglycerides level  Pain control and antiemetics      DVT prophylaxis:  Medical DVT prophylaxis orders are present.      CODE STATUS:    Code Status (Patient has no pulse and is not breathing): CPR (Attempt to Resuscitate)  Medical Interventions (Patient has pulse or is breathing): Full Support        Admission Status:  I believe this patient meets inpt status.      I discussed the patient's findings and my recommendations with patient.      Signature:     This document has been electronically signed by Mine Cifuentes MD on February 3, 2024 01:57 [facility] Hospitalist Team      Electronically signed by Mine Cifuentes MD at 02/03/24 0616          Emergency Department Notes        Red Arias MD at 02/02/24 8579          Subjective   History of Present Illness  38-year-old female describes upper abdominal and left upper quadrant pain over the last 2 days it is increasing in intensity.  She denies any changes with meal intake.  She reports no fevers or chills.  She has had nausea.  She reports no hematemesis.  She states her urine appeared discolored and stool appeared light in color.  She reports no melena or hematochezia.  She reports no relieving or exacerbating factors.  Review of Systems  No dysuria or cough or congestion or fever no known ill contacts or unusual ingestions  No past medical history on file.  Childbirth about 4 months ago  No Known Allergies    No past surgical history on file.    No family history on file.    Social History     Socioeconomic History    Marital status:        Prior to Admission medications    Not on File     /81   Pulse 62   Temp 97.5 °F (36.4 °C) (Oral)   Resp 17   Ht 177.8 cm (70\")   Wt 81.6 kg (180 lb)   LMP  " (LMP Unknown)   SpO2 100%   BMI 25.83 kg/m²       Objective   Physical Exam  General: Well-developed female in acute distress secondary to discomfort, awake alert and pleasant  Eyes:  sclera nonicteric  HEENT: Mucous membranes moist, no mucosal swelling  Neck: Supple, no nuchal rigidity, no JVD  Respirations: Respirations nonlabored, equal breath sounds bilaterally, clear lungs  Heart regular rate and rhythm, no murmurs rubs or gallops,   Abdomen soft, tender palpation left upper quadrant, no rebound or guarding, no Rogers sign, nondistended, no hepatosplenomegaly, no hernia, no mass, normal bowel sounds, no CVA tenderness  Extremities no clubbing cyanosis or edema, calves are symmetric and nontender  Neuro cranial nerves grossly intact, no focal limb deficits  Psych oriented, pleasant affect  Skin no rash, brisk cap refill  Procedures          ED Course      Results for orders placed or performed during the hospital encounter of 02/02/24   Comprehensive Metabolic Panel    Specimen: Blood   Result Value Ref Range    Glucose 127 (H) 65 - 99 mg/dL    BUN 13 6 - 20 mg/dL    Creatinine 0.67 0.57 - 1.00 mg/dL    Sodium 140 136 - 145 mmol/L    Potassium 3.9 3.5 - 5.2 mmol/L    Chloride 102 98 - 107 mmol/L    CO2 21.0 (L) 22.0 - 29.0 mmol/L    Calcium 9.6 8.6 - 10.5 mg/dL    Total Protein 7.2 6.0 - 8.5 g/dL    Albumin 4.8 3.5 - 5.2 g/dL    ALT (SGPT) 1,073 (H) 1 - 33 U/L    AST (SGOT) 759 (H) 1 - 32 U/L    Alkaline Phosphatase 487 (H) 39 - 117 U/L    Total Bilirubin 2.8 (H) 0.0 - 1.2 mg/dL    Globulin 2.4 gm/dL    A/G Ratio 2.0 g/dL    BUN/Creatinine Ratio 19.4 7.0 - 25.0    Anion Gap 17.0 (H) 5.0 - 15.0 mmol/L    eGFR 115.6 >60.0 mL/min/1.73   Lipase    Specimen: Blood   Result Value Ref Range    Lipase >3,000 (H) 13 - 60 U/L   hCG, Serum, Qualitative    Specimen: Blood   Result Value Ref Range    HCG Qualitative Negative Negative   Urinalysis With Culture If Indicated - Urine, Clean Catch    Specimen: Urine, Clean  Catch   Result Value Ref Range    Color, UA Dark Yellow (A) Yellow, Straw    Appearance, UA Clear Clear    pH, UA <=5.0 5.0 - 8.0    Specific Gravity, UA 1.017 1.005 - 1.030    Glucose, UA Negative Negative    Ketones, UA Negative Negative    Bilirubin, UA Negative Negative    Blood, UA Negative Negative    Protein, UA Negative Negative    Leuk Esterase, UA Negative Negative    Nitrite, UA Negative Negative    Urobilinogen, UA 0.2 E.U./dL 0.2 - 1.0 E.U./dL   CBC Auto Differential    Specimen: Blood   Result Value Ref Range    WBC 15.60 (H) 3.40 - 10.80 10*3/mm3    RBC 5.52 (H) 3.77 - 5.28 10*6/mm3    Hemoglobin 15.1 12.0 - 15.9 g/dL    Hematocrit 47.7 (H) 34.0 - 46.6 %    MCV 86.4 79.0 - 97.0 fL    MCH 27.4 26.6 - 33.0 pg    MCHC 31.7 31.5 - 35.7 g/dL    RDW 14.0 12.3 - 15.4 %    RDW-SD 42.4 37.0 - 54.0 fl    MPV 8.9 6.0 - 12.0 fL    Platelets 334 140 - 450 10*3/mm3    Neutrophil % 88.6 (H) 42.7 - 76.0 %    Lymphocyte % 5.5 (L) 19.6 - 45.3 %    Monocyte % 5.0 5.0 - 12.0 %    Eosinophil % 0.5 0.3 - 6.2 %    Basophil % 0.4 0.0 - 1.5 %    Neutrophils, Absolute 13.80 (H) 1.70 - 7.00 10*3/mm3    Lymphocytes, Absolute 0.90 0.70 - 3.10 10*3/mm3    Monocytes, Absolute 0.80 0.10 - 0.90 10*3/mm3    Eosinophils, Absolute 0.10 0.00 - 0.40 10*3/mm3    Basophils, Absolute 0.10 0.00 - 0.20 10*3/mm3    nRBC 0.0 0.0 - 0.2 /100 WBC   POC Lactate    Specimen: Blood   Result Value Ref Range    Lactate 1.6 0.3 - 2.0 mmol/L     CT Abdomen Pelvis With Contrast    Result Date: 2/3/2024  Impression: 1.Fluid surrounding an enlarged pancreas suggestive of acute pancreatitis. Correlation with pancreatic enzyme levels would be recommended. 2.1 cm stone in the dependent aspect of the gallbladder with trace pericholecystic fluid. 3.Moderate stool burden suggest constipation. 4.Multiple uterine fibroids some of which are centrally low density and peripherally calcified likely necrotic. Electronically Signed: Dillan Tran MD  2/3/2024 1:04 AM EST   Workstation ID: [id number]                                          Medical Decision Making  Presents with upper abdominal pain differential diagnosis including bowel obstruction, cholecystitis, pancreatitis, ulcer    She was ordered IV morphine and Zofran for acute pain management as well as IV fluids.  She was resting more company reexamination.  Patient has no signs of peritonitis or acute abdomen.  She was advised of the findings showing findings of acute pancreatitis and liver enzyme elevation and gallstone.  She did have some leukocytosis she was ordered some IV Rocephin.  Case discussed with Dr. Weinstein with the hospitalist service for admission.  Admission was discussed with the patient and she is agreeable to the plan.    Problems Addressed:  Acute pancreatitis, unspecified complication status, unspecified pancreatitis type: complicated acute illness or injury    Amount and/or Complexity of Data Reviewed  Labs: ordered. Decision-making details documented in ED Course.     Details: Elevated lipase, elevated transaminase and bilirubin, lactate normal, CBC shows some leukocytosis, normal hemoglobin, hCG negative  Radiology: ordered and independent interpretation performed.     Details: My independent interpretation of CT abdomen image gallstone in the gallbladder, inflammation of the pancreas, fibroids    Risk  Prescription drug management.  Decision regarding hospitalization.        Final diagnoses:   Acute pancreatitis, unspecified complication status, unspecified pancreatitis type       ED Disposition  ED Disposition       ED Disposition   Decision to Admit    Condition   --    Comment   Level of Care: Telemetry [5]   Admitting Physician: COMPA WEINSTEIN [[id number]]   Attending Physician: COMPA WEINSTEIN [[id number]]                 No follow-up provider specified.       Medication List      No changes were made to your prescriptions during this visit.            Red Arias MD  02/03/24  0152      Electronically signed by Red Arias MD at 24 0152       Operative/Procedure Notes (last 4 days)  Notes from 24 1350 through 24 1350   No notes of this type exist for this encounter.          Physician Progress Notes (last 4 days)        Bal Prajapati MD at 24 1214                [facility] MEDICINE SERVICE  DAILY PROGRESS NOTE    Patient Name: Magaly Aggarwal  : 1985  MRN: [id number]  Primary Care Physician:  Tammi Ny MD  Date of admission: 2024  Date of service: 24      Subjective      Chief Complaint: Abdominal pain.    Patient Reports no abdominal pain currently.  No nausea vomiting.  Patient is feeling better.  No chest pain or shortness of breath.  Dizziness no lightheadedness.    ROS A 12 point review of system was done and was negative except as mentioned above      Objective      Vitals:   Temp:  [98.2 °F (36.8 °C)-99.3 °F (37.4 °C)] 99 °F (37.2 °C)  Heart Rate:  [] 100  Resp:  [15-18] 17  BP: (115-138)/(76-88) 138/83    Physical Exam  Exam conducted with a chaperone present.   Constitutional:       Appearance: Normal appearance.   HENT:      Head: Normocephalic and atraumatic.      Nose: Nose normal.      Mouth/Throat:      Mouth: Mucous membranes are moist.   Cardiovascular:      Rate and Rhythm: Normal rate.   Pulmonary:      Effort: Pulmonary effort is normal. No respiratory distress.      Breath sounds: Normal breath sounds. No stridor. No wheezing, rhonchi or rales.   Chest:      Chest wall: No tenderness.   Abdominal:      General: Abdomen is flat. There is no distension.      Palpations: Abdomen is soft. There is no mass.      Tenderness: There is no abdominal tenderness.   Neurological:      General: No focal deficit present.      Mental Status: She is alert.             Result Review    Result Review:  I have personally reviewed the results from the time of this admission to 2024 12:15 EST and agree with  these findings:  [x]  Laboratory  []  Microbiology  [x]  Radiology  []  EKG/Telemetry   []  Cardiology/Vascular   []  Pathology  []  Old records  []  Other:            Assessment & Plan      Brief Patient Summary:  Magaly Aggarwal is a 38 y.o. female with no significant past medical history who presented to [facility] on 2/2/2024 with complaints of abdominal pain..  Patient is 4 months postpartum.  Pregnancy was uncomplicated except for high blood pressure on the day of delivery.  Denies any history of pancreatitis.  Denies any history of alcohol use or having gallstones.  Denies any fever or chills.     In the ED, blood pressure 182/116.  Labs remarkable for WBC 15.6, , 6 , ALT 1073, T. bili 2.8, lipase greater than 3000.  CT abdomen shows findings suggestive of acute pancreatitis.  There is a 1 cm stone in the dependent aspect of the gallbladder.         cefTRIAXone, 1,000 mg, Intravenous, Q24H  enoxaparin, 40 mg, Subcutaneous, Q24H  senna-docusate sodium, 2 tablet, Oral, BID  sodium chloride, 10 mL, Intravenous, Q12H       lactated ringers, 200 mL/hr, Last Rate: 200 mL/hr (02/04/24 0754)         Active Hospital Problems:  Active Hospital Problems    Diagnosis     **Acute pancreatitis      Plan:   Abdominal pain  Acute pancreatitis  Cholelithiasis  Leukocytosis  Uterine fibroids      Will treat the patient with IV fluids and analgesics as needed.  Keep patient n.p.o..  Patient is abdominal pain is improved.  LFT has improved.  Lipase is improved.  Follow-up electrolytes.  Ultrasound shows Fluid surrounding an enlarged pancreas suggestive of acute pancreatitis. Correlation with pancreatic enzyme levels would be recommended.  2.1 cm stone in the dependent aspect of the gallbladder with trace pericholecystic fluid.  GI is consulted.  For MRI today.  Ultrasound shows uterine fibroids.  Outpatient GYN evaluation and management to be arranged by PCP.  Pain control and antiemetics    "  Patient was updated with plan of care.  All questions answered  DVT prophylaxis:  Medical DVT prophylaxis orders are present.        CODE STATUS:    Code Status (Patient has no pulse and is not breathing): CPR (Attempt to Resuscitate)  Medical Interventions (Patient has pulse or is breathing): Full Support      Disposition:  I expect patient to be discharged 1 to 2 days        Electronically signed by Bal Prajapati MD, 02/04/24, 12:15 EST.  [facility]             Electronically signed by Bal Prajapati MD at 02/04/24 1219       Drea Brown APRN at 02/04/24 0830       Attestation signed by Nico Orozco MD at 02/04/24 1812    I have reviewed this documentation and agree.  38-year-old male with acute gallstone pancreatitis.  She is feeling much better today.  She is having less abdominal pain.  She is tolerating her diet without nausea or vomiting.  On exam her abdomen is nondistended good bowel sounds and she is nontender.  White blood count 10.4.  Hemoglobin 14.9.  Lipase 1985 which is decreased from yesterday.  Bilirubin 1.3 which is decreased from yesterday.  MRCP suggests acute pancreatitis but no evidence of choledocholithiasis.  I am recommending low-fat diet.  She may be discharged home when otherwise medically stable.  Will consult general surgery for laparoscopic cholecystectomy.  Follow-up in my office as an outpatient as needed.  We will see as needed.  I saw the patient today with Drea Brown NP.  I have performed a complete history and physical examination and reviewed appropriate laboratory studies and radiographic exams.  I have completed the majority and substantive portion of the history and physical examination.  I completed the majority and substantive portion of the medical decision making.    Nico Orozco M.D.                     LOS: 1 day   Patient Care Team:  Tammi Ny MD as PCP - General (Family Medicine)      Subjective   \"Feeling a little " "hungry but flako afraid to eat\"    Interval History:   Pending morning labs, spoke to bedside nurse to contact phlebotomist.   Pending MRCP, have changed to STAT since no MRI tech working this weekend, only on call.   Lipase came down to 1985 yesterday.  Triglycerides were normal at 65.    ROS:   Abdominal pain improving   No chest pain, shortness of breath, or cough.         Medication Review:     Current Facility-Administered Medications:     sennosides-docusate (PERICOLACE) 8.6-50 MG per tablet 2 tablet, 2 tablet, Oral, BID **AND** polyethylene glycol (MIRALAX) packet 17 g, 17 g, Oral, Daily PRN **AND** bisacodyl (DULCOLAX) EC tablet 5 mg, 5 mg, Oral, Daily PRN **AND** bisacodyl (DULCOLAX) suppository 10 mg, 10 mg, Rectal, Daily PRN, Mine Cifuentes MD    cefTRIAXone (ROCEPHIN) 1,000 mg in sodium chloride 0.9 % 100 mL IVPB, 1,000 mg, Intravenous, Q24H, Mine Cifuentes MD, Last Rate: 200 mL/hr at 02/04/24 0155, 1,000 mg at 02/04/24 0155    Enoxaparin Sodium (LOVENOX) syringe 40 mg, 40 mg, Subcutaneous, Q24H, Mine Cifuentes MD, 40 mg at 02/03/24 1702    lactated ringers infusion, 200 mL/hr, Intravenous, Continuous, Drea Brown, APRN, Last Rate: 200 mL/hr at 02/04/24 0754, 200 mL/hr at 02/04/24 0754    morphine injection 4 mg, 4 mg, Intravenous, Q4H PRN, Mine Cifuentes MD, 4 mg at 02/04/24 0504    ondansetron (ZOFRAN) injection 4 mg, 4 mg, Intravenous, Q6H PRN, Mine Cifuentes MD, 4 mg at 02/03/24 8672    [COMPLETED] Insert Peripheral IV, , , Once **AND** sodium chloride 0.9 % flush 10 mL, 10 mL, Intravenous, PRN, Red Arias MD    sodium chloride 0.9 % flush 10 mL, 10 mL, Intravenous, Q12H, Mine Cifuentes MD, 10 mL at 02/03/24 0844    sodium chloride 0.9 % flush 10 mL, 10 mL, Intravenous, PRN, Mine Cifuentes MD    sodium chloride 0.9 % infusion 40 mL, 40 mL, Intravenous, PRN, Mine Cifuentes MD      Objective resting in hospital bed breast-feeding.  NAD.  No family " "present.    Vital Signs  Temp:  [97.3 °F (36.3 °C)-99.3 °F (37.4 °C)] 98.2 °F (36.8 °C)  Heart Rate:  [69-98] 98  Resp:  [15-20] 18  BP: (115-137)/(76-87) 137/87  Physical Exam:    General Appearance:    Awake and alert, in no acute distress   Head:    Normocephalic, without obvious abnormality   Eyes:          Conjunctivae normal, anicteric sclerae   Ears:    Hearing intact   Throat:   No oral lesions, no thrush, oral mucosa moist   Neck:   No adenopathy, supple, no JVD   Lungs:      respirations regular, even and unlabored        Abdomen:     Normal bowel sounds, soft, tender, no rebound or guarding, non-distended, no hepatosplenomegaly   Rectal:     Deferred   Extremities:   No edema, no cyanosis, no redness   Skin:   No bleeding, bruising or rash, no jaundice   Neurologic:   Cranial nerves 2 - 12 grossly intact, no asterixis, sensation   intact        Results Review:    CBC    Results from last 7 days   Lab Units 02/03/24  0531 02/02/24  2351   WBC 10*3/mm3 10.40 15.60*   HEMOGLOBIN g/dL 14.9 15.1   PLATELETS 10*3/mm3 291 334     CMP   Results from last 7 days   Lab Units 02/03/24  0717 02/02/24  2351   SODIUM mmol/L 142 140   POTASSIUM mmol/L 3.8 3.9   CHLORIDE mmol/L 107 102   CO2 mmol/L 20.0* 21.0*   BUN mg/dL 10 13   CREATININE mg/dL 0.62 0.67   GLUCOSE mg/dL 108* 127*   ALBUMIN g/dL 4.2 4.8   BILIRUBIN mg/dL 1.3* 2.8*   ALK PHOS U/L 405* 487*   AST (SGOT) U/L 387* 759*   ALT (SGPT) U/L 821* 1,073*   LIPASE U/L 1,986* >3,000*     Cr Clearance Estimated Creatinine Clearance: 158.9 mL/min (by C-G formula based on SCr of 0.62 mg/dL).  Coag     HbA1C   Lab Results   Component Value Date    HGBA1C 4.8 05/02/2023     Blood Glucose No results found for: \"POCGLU\"  Infection   Results from last 7 days   Lab Units 02/03/24  0217 02/03/24  0207   BLOODCX  No growth at 24 hours Abnormal Stain*   BCIDPCR   --  Staph spp, not aureus or lugdunensis. Identification by BCID2 PCR.*     UA    Results from last 7 days   Lab " Units 02/03/24  0110   NITRITE UA  Negative     Radiology(recent) CT Abdomen Pelvis With Contrast    Result Date: 2/3/2024  Impression: 1.Fluid surrounding an enlarged pancreas suggestive of acute pancreatitis. Correlation with pancreatic enzyme levels would be recommended. 2.1 cm stone in the dependent aspect of the gallbladder with trace pericholecystic fluid. 3.Moderate stool burden suggest constipation. 4.Multiple uterine fibroids some of which are centrally low density and peripherally calcified likely necrotic. Electronically Signed: Dillan Tran MD  2/3/2024 1:04 AM EST  Workstation ID: [id number]           Assessment & Plan   Pancreatitis most likely related to gallstones  Abdominal pain related to above  Gallstone  Postpartum x 4 months  Constipation  Elevated LFTs related to pancreatitis  Left lower extremity DVT off anticoagulation  History of SVT off metoprolol x 1 month  Breast-feeding    PLAN:  MRCP ordered yesterday but not done.  Evidently MRI is just on-call today and will only come in for stat so order has been changed to stat.  Pending morning labs to be drawn by phlebotomist.  Keep n.p.o. until after MRI is done.  Instructed if MRI is normal we will try some clear liquids and see how she does.  Discussed with bedside RN.  Patient states her pain is a 1 and does not feel like she needs any morphine.  Discussed that I will order her a pain pill and she may want to try that when she needs something for pain.  Continue IV fluids for hydration.  Antiemetics and pain medication as needed.       BORA Sauceda  02/04/24  08:30 EST              Electronically signed by Nico Orozco MD at 02/04/24 1812          Consult Notes (last 4 days)        Jabier Benson MD at 02/05/24 1202        Consult Orders    1. Inpatient General Surgery Consult [[id number]] ordered by Drea Brown APRN                 Inpatient General Surgery Consult  Consult performed by: Jabier Benson MD  Consult  ordered by: Drea Brown APRN  Reason for consult: Gallstone pancreatitis          General Surgery Consult Note      Name: Magaly Aggarwal ADMIT: 2024   : 1985  PCP: Tammi Ny MD    MRN: [id number] LOS: 2 days   AGE/SEX: 38 y.o. 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  She is feeling better the pain is improving.  She has been tolerating some diet.  She started to have bowel function is on the looser side.  LFTs initially were elevated and those are improving.  She did have an MRI which did not show any evidence of choledocholithiasis.  But showed cholelithiasis.    History reviewed. No pertinent past medical history.  History reviewed. No pertinent surgical history.  History reviewed. No pertinent family history.    Social History     Tobacco Use    Smoking status: Never     Passive exposure: Never    Smokeless tobacco: Never   Vaping Use    Vaping Use: Never used   Substance Use Topics    Alcohol use: Defer    Drug use: Never     Medications Prior to Admission   Medication Sig Dispense Refill Last Dose    Etonogestrel (NEXPLANON) 68 MG implant subdermal implant Inject 1 each into the appropriate area of the skin as directed by provider 1 (One) Time.        enoxaparin, 40 mg, Subcutaneous, Q24H  senna-docusate sodium, 2 tablet, Oral, BID           senna-docusate sodium **AND** polyethylene glycol **AND** bisacodyl **AND** bisacodyl    HYDROcodone-acetaminophen    Morphine    ondansetron    sodium chloride  Patient has no known allergies.    Review of Systems   Constitutional:  Negative  for chills and fever.   HENT:  Negative for sore throat and trouble swallowing.    Eyes:  Negative for blurred vision and double vision.   Respiratory:  Negative for cough and shortness of breath.    Cardiovascular:  Negative for chest pain and leg swelling.   Gastrointestinal:  Positive for abdominal pain, diarrhea, nausea and vomiting. Negative for abdominal distention, blood in stool and constipation.   Genitourinary:  Negative for dysuria and hematuria.   Neurological:  Negative for dizziness and confusion.        Objective     Vital Signs and Labs:  Vital Signs Patient Vitals for the past 24 hrs:   BP Temp Temp src Pulse Resp SpO2   02/05/24 0900 125/77 99.1 °F (37.3 °C) Oral 120 16 97 %   02/05/24 0326 118/79 99.9 °F (37.7 °C) Oral 117 16 95 %   02/04/24 2342 129/83 98 °F (36.7 °C) Oral 113 16 94 %   02/04/24 1942 134/97 -- -- 92 18 100 %   02/04/24 1551 127/76 97.7 °F (36.5 °C) Oral 110 -- 99 %       Physical Exam:  Physical Exam  Constitutional:       Appearance: Normal appearance.   HENT:      Head: Normocephalic and atraumatic.   Eyes:      General: No scleral icterus.     Conjunctiva/sclera: Conjunctivae normal.   Cardiovascular:      Rate and Rhythm: Tachycardia present.   Pulmonary:      Effort: Pulmonary effort is normal. No respiratory distress.   Abdominal:      General: There is no distension.      Palpations: Abdomen is soft.   Skin:     General: Skin is warm and dry.   Neurological:      General: No focal deficit present.      Mental Status: She is alert. Mental status is at baseline.   Psychiatric:         Mood and Affect: Mood normal.         Behavior: Behavior normal.         CBC    Results from last 7 days   Lab Units 02/05/24  0635 02/04/24  0904 02/03/24  0531 02/02/24  2351   WBC 10*3/mm3 10.70 9.50 10.40 15.60*   HEMOGLOBIN g/dL 12.5 13.0 14.9 15.1   PLATELETS 10*3/mm3 261 264 291 334     CMP   Results from last 7 days   Lab Units 02/05/24  0635 02/04/24  0904 02/03/24  0717 02/02/24  2351    SODIUM mmol/L 132* 138 142 140   POTASSIUM mmol/L 3.2* 3.6 3.8 3.9   CHLORIDE mmol/L 98 101 107 102   CO2 mmol/L 24.0 21.0* 20.0* 21.0*   BUN mg/dL 4* 7 10 13   CREATININE mg/dL 0.50* 0.49* 0.62 0.67   GLUCOSE mg/dL 123* 70 108* 127*   ALBUMIN g/dL 3.9 4.0 4.2 4.8   BILIRUBIN mg/dL 0.6 0.8 1.3* 2.8*   ALK PHOS U/L 318* 334* 405* 487*   AST (SGOT) U/L 39* 68* 387* 759*   ALT (SGPT) U/L 270* 409* 821* 1,073*   LIPASE U/L 25 94* 1,986* >3,000*     Radiology(recent) MRI abdomen w wo contrast mrcp    Result Date: 2/4/2024  Impression: 1. Relatively mild peripancreatic edema at the level of the pancreatic tail and adjacent pancreatic body consistent with pancreatitis and similar to previous CT scan. 2. Multiple small gallstones. No visible gallbladder inflammation. 3. Common hepatic duct and proximal common bile duct at upper limits of normal diameter. No visible common duct stone. Electronically Signed: Amaury Valadez MD  2/4/2024 2:37 PM EST  Workstation ID: [id number]     I reviewed the patient's new clinical results.  I reviewed the patient's new imaging results and agree with the interpretation.    Assessment & Plan       Acute pancreatitis      38 y.o. female admitted with acute pancreatitis likely gallstone pancreatitis.    Talked her about my role in her care.  We talked about the role of cholecystectomy for gallstone pancreatitis.  Talked at the indications for surgery the steps of the operation anticipated recovery.  Talked with possible complications like bleeding infection incidental injury.  We talked about open surgery and bile duct injury as well.  We talked about life not a gallbladder.  After our discussion about cholecystectomy she initially was hoping to be able to get this done as an interval cholecystectomy which I would be okay with but I also would be willing to take her gallbladder out tomorrow around noon.  She is going to talk about with her  and make final decision but tentatively will  schedule the cholecystectomy with intraoperative cholangiogram for tomorrow.       This note was created using Dragon Voice Recognition software.    Jabier Benson MD  02/05/24  12:02 EST    Electronically signed by Jabier Benson MD at 02/05/24 1206       Drea Brown APRN at 02/03/24 0819       Attestation signed by Nico Orozco MD at 02/03/24 1640    I have reviewed this documentation and agree.  38-year-old woman 4 weeks postpartum who presented with epigastric pain and elevated liver enzymes.  She denies any history of liver disease or pancreatitis.  She began having severe epigastric and left upper quadrant pain.  The pain is much improved today.  On exam her abdomen is nondistended with good bowel sounds.  She is mildly tender in the epigastric area.  White blood count 10.4.  Hemoglobin 14.9.  BUN 13 creatinine 0.67.  AST 79 ALT 1073 lipase greater than 3000 alk phos 47 bilirubin 2.8.  CT shows evidence of acute pancreatitis as well as a 2.1 cm stone in the gallbladder.  MRCP is pending.  She likely has acute gallstone pancreatitis.  Will check MRCP to rule out choledocholithiasis.  Will continue IV fluids and clear liquid diet.  If MRCP negative she will need cholecystectomy.  We will make further recommendations after MRCP is completed.  I saw the patient today with Drea Brown NP.  I have performed a complete history and physical examination and reviewed appropriate laboratory studies and radiographic exams.  I have completed the majority and substantive portion of the history and physical examination.  I completed the majority and substantive portion of the medical decision making.    Nico Orozco M.D.                    GI CONSULT  NOTE:    Referring Provider:     Dr. Cifuentes    Chief complaint:    Acute pancreatitis    Subjective    Upper abdominal pain    History of present illness:     Patient is a 38-year-old 4-month postpartum female who presented to the ER on 2/2/2024 with a  complaint of upper abdominal pain for the last 2 days.  Patient had some associated nausea.  Patient was evaluated in the ER via labs and CT and was found to have elevated LFTs, lipase.  CT of the abdomen pelvis confirmed acute pancreatitis.  GI was consulted.  Patient states she has had 6-7 episodes of what she thought was gas pain since having her child 4 months ago.  Patient states her most recent symptoms started on Thursday, 2/1/2024.  Pain is in the epigastric to left upper quadrant.  Pain usually last a couple of hours and then resolves.  Pain was better yesterday Friday but then returned about 1 hour after eating dinner Friday night.  Hot shower helped.  She noticed her urine was turning darker on that day.  She had 4 episodes of nausea and vomiting that she denies was bloody.  Patient has been having daily bowel movements generally first thing in the morning.  Has not been feeling constipated.  Patient has been on no months for the previous 1 month.  States she was diagnosed with a left lower extremity DVT in March 2023 when she was 10 weeks pregnant.  She was on Lovenox at 1 point but has been off of it since October 2023.  Patient states she also had a episode of SVT during pregnancy and she has been off of metoprolol for the previous 1 month.  Patient denies any previous history of pancreatitis.  No family history of pancreatitis or pancreatic cancer.  Patient denies any history of hyperlipidemia.  Patient states she maybe has 1 drink per month of alcohol but none recently.  Patient denied having any history of gallstones.  Patient states her mother did have her gallbladder removed several years ago.  Patient is breast-feeding.    Labs: Creatinine 0.67.  Total bilirubin 2.8, alk phos 47, , ALT 1073.  Lipase greater than 3000.  WBCs 10.4, hemoglobin 14.9, platelets 291.  CT with contrast showed acute pancreatitis with fluid surrounding and enlarged pancreas.  2.1 cm stone dependent aspect of the  gallbladder with trace pericholecystic fluid.  Moderate stool burden.    Endo History:  None    Past Medical History:  History reviewed. No pertinent past medical history.    Past Surgical History: [place] teeth removal  History reviewed. No pertinent surgical history.    Social History:  Social History     Tobacco Use    Smoking status: Never     Passive exposure: Never    Smokeless tobacco: Never   Vaping Use    Vaping Use: Never used   Substance Use Topics    Alcohol use: Defer    Drug use: Never       Family History:  History reviewed. No pertinent family history.    Medications:  Medications Prior to Admission   Medication Sig Dispense Refill Last Dose    Etonogestrel (NEXPLANON) 68 MG implant subdermal implant Inject 1 each into the appropriate area of the skin as directed by provider 1 (One) Time.          Scheduled Meds:[START ON 2/4/2024] cefTRIAXone, 1,000 mg, Intravenous, Q24H  enoxaparin, 40 mg, Subcutaneous, Q24H  senna-docusate sodium, 2 tablet, Oral, BID  sodium chloride, 10 mL, Intravenous, Q12H      Continuous Infusions:lactated ringers, 150 mL/hr, Last Rate: 150 mL/hr (02/03/24 0310)      PRN Meds:.  senna-docusate sodium **AND** polyethylene glycol **AND** bisacodyl **AND** bisacodyl    Morphine    ondansetron    [COMPLETED] Insert Peripheral IV **AND** sodium chloride    sodium chloride    sodium chloride    ALLERGIES:  Patient has no known allergies.    ROS:  Review of Systems   Gastrointestinal:  Positive for abdominal pain, nausea and vomiting. Negative for anal bleeding, blood in stool, constipation, diarrhea and rectal pain.       The following systems were reviewed and negative;    Constitution:  No fevers, chills, no unintentional weight loss  Skin: no rash, no jaundice  Eyes:  No blurry vision, no eye pain  HENT:  No change in hearing or smell  Resp:  No dyspnea or cough  CV:  No chest pain or palpitations  :  No dysuria, hematuria  Musculoskeletal:  No leg cramps or arthralgias  Neuro:   No tremor, no numbness  Psych:  No depression or confusion    Objective  Resting in the hospital bed. NAD. No family present. Rm 406    Vital Signs:   Vitals:    02/03/24 0002 02/03/24 0017 02/03/24 0317 02/03/24 0618   BP: 151/88 146/81 148/82 135/88   BP Location:   Right arm Left arm   Patient Position:   Lying Lying   Pulse: 73 62 79 69   Resp:   22 20   Temp:   98.2 °F (36.8 °C) 97.8 °F (36.6 °C)   TempSrc:   Oral Oral   SpO2: 100% 100% 99% 98%   Weight:       Height:           Physical Exam:    General Appearance:    Awake and alert, in no acute distress   Head:    Normocephalic, without obvious abnormality, atraumatic   Eyes:            Conjunctivae normal, anicteric sclerae, pupils equal   Ears:    Ears appear intact with no abnormalities noted   Throat:   No oral lesions, no thrush, oral mucosa moist   Neck:   Supple, no JVD   Lungs:     Clear to auscultation bilaterally, respirations regular, even and unlabored        Chest Wall:    No abnormalities observed   Abdomen:     Normal bowel sounds, soft, tender, no rebound or guarding, nondistended, no hepatosplenomegaly   Rectal:     Deferred   Extremities:   Moves all extremities, no edema, no cyanosis   Pulses:   Pulses palpable and equal bilaterally   Skin:   No rash, no jaundice, normal palpation        Neurologic:   Cranial nerves 2 - 12 grossly intact, no asterixis       Results Review:   I reviewed the patient's labs and imaging.  CBC    Results from last 7 days   Lab Units 02/03/24  0531 02/02/24  2351   WBC 10*3/mm3 10.40 15.60*   HEMOGLOBIN g/dL 14.9 15.1   PLATELETS 10*3/mm3 291 334     CMP   Results from last 7 days   Lab Units 02/02/24  2351   SODIUM mmol/L 140   POTASSIUM mmol/L 3.9   CHLORIDE mmol/L 102   CO2 mmol/L 21.0*   BUN mg/dL 13   CREATININE mg/dL 0.67   GLUCOSE mg/dL 127*   ALBUMIN g/dL 4.8   BILIRUBIN mg/dL 2.8*   ALK PHOS U/L 487*   AST (SGOT) U/L 759*   ALT (SGPT) U/L 1,073*   LIPASE U/L >3,000*     Cr Clearance Estimated Creatinine  "Clearance: 148.1 mL/min (by C-G formula based on SCr of 0.67 mg/dL).  Coag     HbA1C   Lab Results   Component Value Date    HGBA1C 4.8 05/02/2023     Blood Glucose No results found for: \"POCGLU\"  Infection     UA    Results from last 7 days   Lab Units 02/03/24  0110   NITRITE UA  Negative     Radiology(recent) CT Abdomen Pelvis With Contrast    Result Date: 2/3/2024  Impression: 1.Fluid surrounding an enlarged pancreas suggestive of acute pancreatitis. Correlation with pancreatic enzyme levels would be recommended. 2.1 cm stone in the dependent aspect of the gallbladder with trace pericholecystic fluid. 3.Moderate stool burden suggest constipation. 4.Multiple uterine fibroids some of which are centrally low density and peripherally calcified likely necrotic. Electronically Signed: Dillan Tran MD  2/3/2024 1:04 AM EST  Workstation ID: [id number]       ASSESSMENT:  Acute pancreatitis questionable biliary related  Abdominal pain related to above  Gallstone  Postpartum x 4 months  Constipation  Elevated LFTs related to pancreatitis  Left lower extremity DVT off anticoagulation  History of SVT off metoprolol x 1 month  Breast-feeding    PLAN:    Pending triglyceride level.  Recommend n.p.o. status with IV fluids for hydration until patient is under better pain control.  Increase lactated Ringer's to 200 cc an hour.  Antiemetic and pain medications as needed.  Would suggest eventual cholecystectomy as microlithiasis could be the cause of her pancreatitis she does not drink alcohol and she is on no medications that are known to cause pancreatitis.  Agree with Rocephin.   Okay for ice chips if it does not cause her increased pain nausea or vomiting.  Discussed parameters for discharge.  Discussed pump and dump.  Will get Dulcolax suppository for constipation.    I discussed the patient's findings and my recommendations with the patient.  Drea Brown, APRN  02/03/24  08:19 EST    Time:            Electronically signed by " Nico Oorzco MD at 02/03/24 1640

## 2024-02-06 ENCOUNTER — ANESTHESIA EVENT (OUTPATIENT)
Dept: PERIOP | Facility: HOSPITAL | Age: 39
End: 2024-02-06
Payer: COMMERCIAL

## 2024-02-06 ENCOUNTER — ANESTHESIA (OUTPATIENT)
Dept: PERIOP | Facility: HOSPITAL | Age: 39
End: 2024-02-06
Payer: COMMERCIAL

## 2024-02-06 ENCOUNTER — APPOINTMENT (OUTPATIENT)
Dept: GENERAL RADIOLOGY | Facility: HOSPITAL | Age: 39
End: 2024-02-06
Payer: COMMERCIAL

## 2024-02-06 LAB
ALBUMIN SERPL-MCNC: 3.7 G/DL (ref 3.5–5.2)
ALBUMIN/GLOB SERPL: 1.3 G/DL
ALP SERPL-CCNC: 348 U/L (ref 39–117)
ALT SERPL W P-5'-P-CCNC: 191 U/L (ref 1–33)
ANION GAP SERPL CALCULATED.3IONS-SCNC: 10 MMOL/L (ref 5–15)
AST SERPL-CCNC: 39 U/L (ref 1–32)
B-HCG UR QL: NEGATIVE
BASOPHILS # BLD AUTO: 0 10*3/MM3 (ref 0–0.2)
BASOPHILS NFR BLD AUTO: 0.2 % (ref 0–1.5)
BILIRUB SERPL-MCNC: 0.5 MG/DL (ref 0–1.2)
BUN SERPL-MCNC: 3 MG/DL (ref 6–20)
BUN/CREAT SERPL: 6.3 (ref 7–25)
CALCIUM SPEC-SCNC: 9.2 MG/DL (ref 8.6–10.5)
CHLORIDE SERPL-SCNC: 103 MMOL/L (ref 98–107)
CO2 SERPL-SCNC: 25 MMOL/L (ref 22–29)
CREAT SERPL-MCNC: 0.48 MG/DL (ref 0.57–1)
DEPRECATED RDW RBC AUTO: 40.3 FL (ref 37–54)
EGFRCR SERPLBLD CKD-EPI 2021: 125.3 ML/MIN/1.73
EOSINOPHIL # BLD AUTO: 0.2 10*3/MM3 (ref 0–0.4)
EOSINOPHIL NFR BLD AUTO: 1.7 % (ref 0.3–6.2)
ERYTHROCYTE [DISTWIDTH] IN BLOOD BY AUTOMATED COUNT: 13.5 % (ref 12.3–15.4)
GLOBULIN UR ELPH-MCNC: 2.8 GM/DL
GLUCOSE SERPL-MCNC: 100 MG/DL (ref 65–99)
HCT VFR BLD AUTO: 37.9 % (ref 34–46.6)
HGB BLD-MCNC: 12.2 G/DL (ref 12–15.9)
LYMPHOCYTES # BLD AUTO: 1.3 10*3/MM3 (ref 0.7–3.1)
LYMPHOCYTES NFR BLD AUTO: 12.1 % (ref 19.6–45.3)
MCH RBC QN AUTO: 27.6 PG (ref 26.6–33)
MCHC RBC AUTO-ENTMCNC: 32.1 G/DL (ref 31.5–35.7)
MCV RBC AUTO: 85.9 FL (ref 79–97)
MONOCYTES # BLD AUTO: 0.8 10*3/MM3 (ref 0.1–0.9)
MONOCYTES NFR BLD AUTO: 7.1 % (ref 5–12)
NEUTROPHILS NFR BLD AUTO: 78.9 % (ref 42.7–76)
NEUTROPHILS NFR BLD AUTO: 8.7 10*3/MM3 (ref 1.7–7)
NRBC BLD AUTO-RTO: 0 /100 WBC (ref 0–0.2)
PLATELET # BLD AUTO: 262 10*3/MM3 (ref 140–450)
PMV BLD AUTO: 9.9 FL (ref 6–12)
POTASSIUM SERPL-SCNC: 3.6 MMOL/L (ref 3.5–5.2)
PROT SERPL-MCNC: 6.5 G/DL (ref 6–8.5)
RBC # BLD AUTO: 4.41 10*6/MM3 (ref 3.77–5.28)
SODIUM SERPL-SCNC: 138 MMOL/L (ref 136–145)
WBC NRBC COR # BLD AUTO: 11.1 10*3/MM3 (ref 3.4–10.8)

## 2024-02-06 PROCEDURE — 0FT44ZZ RESECTION OF GALLBLADDER, PERCUTANEOUS ENDOSCOPIC APPROACH: ICD-10-PCS | Performed by: SURGERY

## 2024-02-06 PROCEDURE — 25010000002 GLUCAGON (RDNA) PER 1 MG: Performed by: NURSE ANESTHETIST, CERTIFIED REGISTERED

## 2024-02-06 PROCEDURE — 25010000002 ONDANSETRON PER 1 MG: Performed by: NURSE ANESTHETIST, CERTIFIED REGISTERED

## 2024-02-06 PROCEDURE — 25010000002 CEFAZOLIN PER 500 MG: Performed by: SURGERY

## 2024-02-06 PROCEDURE — 25010000002 FENTANYL CITRATE (PF) 100 MCG/2ML SOLUTION: Performed by: NURSE ANESTHETIST, CERTIFIED REGISTERED

## 2024-02-06 PROCEDURE — 85025 COMPLETE CBC W/AUTO DIFF WBC: CPT | Performed by: STUDENT IN AN ORGANIZED HEALTH CARE EDUCATION/TRAINING PROGRAM

## 2024-02-06 PROCEDURE — 88304 TISSUE EXAM BY PATHOLOGIST: CPT | Performed by: SURGERY

## 2024-02-06 PROCEDURE — 81025 URINE PREGNANCY TEST: CPT | Performed by: SURGERY

## 2024-02-06 PROCEDURE — 25010000002 PROPOFOL 200 MG/20ML EMULSION: Performed by: NURSE ANESTHETIST, CERTIFIED REGISTERED

## 2024-02-06 PROCEDURE — 25010000002 ENOXAPARIN PER 10 MG: Performed by: SURGERY

## 2024-02-06 PROCEDURE — 25010000002 ESMOLOL 100 MG/10ML SOLUTION: Performed by: NURSE ANESTHETIST, CERTIFIED REGISTERED

## 2024-02-06 PROCEDURE — 47563 LAPARO CHOLECYSTECTOMY/GRAPH: CPT | Performed by: SURGERY

## 2024-02-06 PROCEDURE — 25010000002 BUPIVACAINE (PF) 0.25 % SOLUTION: Performed by: SURGERY

## 2024-02-06 PROCEDURE — 25010000002 MIDAZOLAM PER 1 MG: Performed by: NURSE ANESTHETIST, CERTIFIED REGISTERED

## 2024-02-06 PROCEDURE — 25010000002 ONDANSETRON PER 1 MG: Performed by: HOSPITALIST

## 2024-02-06 PROCEDURE — 25810000003 LACTATED RINGERS PER 1000 ML: Performed by: SURGERY

## 2024-02-06 PROCEDURE — 74300 X-RAY BILE DUCTS/PANCREAS: CPT

## 2024-02-06 PROCEDURE — 25810000003 LACTATED RINGERS PER 1000 ML: Performed by: STUDENT IN AN ORGANIZED HEALTH CARE EDUCATION/TRAINING PROGRAM

## 2024-02-06 PROCEDURE — 25010000002 DEXAMETHASONE PER 1 MG: Performed by: NURSE ANESTHETIST, CERTIFIED REGISTERED

## 2024-02-06 PROCEDURE — BF101ZZ FLUOROSCOPY OF BILE DUCTS USING LOW OSMOLAR CONTRAST: ICD-10-PCS | Performed by: SURGERY

## 2024-02-06 PROCEDURE — 25010000002 HYDROMORPHONE 1 MG/ML SOLUTION: Performed by: NURSE ANESTHETIST, CERTIFIED REGISTERED

## 2024-02-06 PROCEDURE — 80053 COMPREHEN METABOLIC PANEL: CPT | Performed by: STUDENT IN AN ORGANIZED HEALTH CARE EDUCATION/TRAINING PROGRAM

## 2024-02-06 PROCEDURE — 0 IOHEXOL 300 MG/ML SOLUTION: Performed by: SURGERY

## 2024-02-06 PROCEDURE — 47563 LAPARO CHOLECYSTECTOMY/GRAPH: CPT

## 2024-02-06 PROCEDURE — 25010000002 FENTANYL CITRATE (PF) 50 MCG/ML SOLUTION: Performed by: NURSE ANESTHETIST, CERTIFIED REGISTERED

## 2024-02-06 PROCEDURE — 25810000003 SODIUM CHLORIDE PER 500 ML: Performed by: SURGERY

## 2024-02-06 DEVICE — CLIPAPPLR M/ ENDO LIGAMAX5 5MM 33CM MD/LG: Type: IMPLANTABLE DEVICE | Site: ABDOMEN | Status: FUNCTIONAL

## 2024-02-06 RX ORDER — SODIUM CHLORIDE 9 MG/ML
INJECTION, SOLUTION INTRAVENOUS AS NEEDED
Status: DISCONTINUED | OUTPATIENT
Start: 2024-02-06 | End: 2024-02-06 | Stop reason: HOSPADM

## 2024-02-06 RX ORDER — HYDROCODONE BITARTRATE AND ACETAMINOPHEN 5; 325 MG/1; MG/1
1 TABLET ORAL EVERY 6 HOURS PRN
Qty: 10 TABLET | Refills: 0 | Status: SHIPPED | OUTPATIENT
Start: 2024-02-06 | End: 2024-02-09

## 2024-02-06 RX ORDER — SODIUM CHLORIDE, SODIUM LACTATE, POTASSIUM CHLORIDE, CALCIUM CHLORIDE 600; 310; 30; 20 MG/100ML; MG/100ML; MG/100ML; MG/100ML
1000 INJECTION, SOLUTION INTRAVENOUS CONTINUOUS
Status: DISCONTINUED | OUTPATIENT
Start: 2024-02-06 | End: 2024-02-07 | Stop reason: HOSPADM

## 2024-02-06 RX ORDER — HYDROCODONE BITARTRATE AND ACETAMINOPHEN 5; 325 MG/1; MG/1
1 TABLET ORAL EVERY 6 HOURS PRN
Qty: 10 TABLET | Refills: 0 | Status: SHIPPED | OUTPATIENT
Start: 2024-02-06 | End: 2024-02-06 | Stop reason: SDUPTHER

## 2024-02-06 RX ORDER — OXYCODONE HYDROCHLORIDE 5 MG/1
5 TABLET ORAL ONCE AS NEEDED
Status: DISCONTINUED | OUTPATIENT
Start: 2024-02-06 | End: 2024-02-06 | Stop reason: HOSPADM

## 2024-02-06 RX ORDER — LABETALOL HYDROCHLORIDE 5 MG/ML
5 INJECTION, SOLUTION INTRAVENOUS
Status: DISCONTINUED | OUTPATIENT
Start: 2024-02-06 | End: 2024-02-06 | Stop reason: HOSPADM

## 2024-02-06 RX ORDER — MEPERIDINE HYDROCHLORIDE 25 MG/ML
12.5 INJECTION INTRAMUSCULAR; INTRAVENOUS; SUBCUTANEOUS
Status: DISCONTINUED | OUTPATIENT
Start: 2024-02-06 | End: 2024-02-06 | Stop reason: HOSPADM

## 2024-02-06 RX ORDER — ACETAMINOPHEN 325 MG/1
650 TABLET ORAL ONCE AS NEEDED
Status: DISCONTINUED | OUTPATIENT
Start: 2024-02-06 | End: 2024-02-06 | Stop reason: HOSPADM

## 2024-02-06 RX ORDER — DEXAMETHASONE SODIUM PHOSPHATE 4 MG/ML
INJECTION, SOLUTION INTRA-ARTICULAR; INTRALESIONAL; INTRAMUSCULAR; INTRAVENOUS; SOFT TISSUE AS NEEDED
Status: DISCONTINUED | OUTPATIENT
Start: 2024-02-06 | End: 2024-02-06 | Stop reason: SURG

## 2024-02-06 RX ORDER — DROPERIDOL 2.5 MG/ML
0.62 INJECTION, SOLUTION INTRAMUSCULAR; INTRAVENOUS ONCE AS NEEDED
Status: DISCONTINUED | OUTPATIENT
Start: 2024-02-06 | End: 2024-02-06 | Stop reason: HOSPADM

## 2024-02-06 RX ORDER — EPHEDRINE SULFATE 5 MG/ML
5 INJECTION INTRAVENOUS ONCE AS NEEDED
Status: DISCONTINUED | OUTPATIENT
Start: 2024-02-06 | End: 2024-02-06 | Stop reason: HOSPADM

## 2024-02-06 RX ORDER — LIDOCAINE HYDROCHLORIDE 10 MG/ML
0.5 INJECTION, SOLUTION INFILTRATION; PERINEURAL ONCE AS NEEDED
Status: DISCONTINUED | OUTPATIENT
Start: 2024-02-06 | End: 2024-02-06 | Stop reason: HOSPADM

## 2024-02-06 RX ORDER — SODIUM CHLORIDE 0.9 % (FLUSH) 0.9 %
10 SYRINGE (ML) INJECTION AS NEEDED
Status: DISCONTINUED | OUTPATIENT
Start: 2024-02-06 | End: 2024-02-06 | Stop reason: HOSPADM

## 2024-02-06 RX ORDER — IPRATROPIUM BROMIDE AND ALBUTEROL SULFATE 2.5; .5 MG/3ML; MG/3ML
3 SOLUTION RESPIRATORY (INHALATION) ONCE AS NEEDED
Status: DISCONTINUED | OUTPATIENT
Start: 2024-02-06 | End: 2024-02-06 | Stop reason: HOSPADM

## 2024-02-06 RX ORDER — PROPOFOL 10 MG/ML
INJECTION, EMULSION INTRAVENOUS AS NEEDED
Status: DISCONTINUED | OUTPATIENT
Start: 2024-02-06 | End: 2024-02-06 | Stop reason: SURG

## 2024-02-06 RX ORDER — FLUMAZENIL 0.1 MG/ML
0.2 INJECTION INTRAVENOUS AS NEEDED
Status: DISCONTINUED | OUTPATIENT
Start: 2024-02-06 | End: 2024-02-06 | Stop reason: HOSPADM

## 2024-02-06 RX ORDER — ONDANSETRON 2 MG/ML
INJECTION INTRAMUSCULAR; INTRAVENOUS AS NEEDED
Status: DISCONTINUED | OUTPATIENT
Start: 2024-02-06 | End: 2024-02-06 | Stop reason: SURG

## 2024-02-06 RX ORDER — MIDAZOLAM HYDROCHLORIDE 1 MG/ML
INJECTION INTRAMUSCULAR; INTRAVENOUS AS NEEDED
Status: DISCONTINUED | OUTPATIENT
Start: 2024-02-06 | End: 2024-02-06 | Stop reason: SURG

## 2024-02-06 RX ORDER — IBUPROFEN 600 MG/1
TABLET ORAL AS NEEDED
Status: DISCONTINUED | OUTPATIENT
Start: 2024-02-06 | End: 2024-02-06 | Stop reason: SURG

## 2024-02-06 RX ORDER — FENTANYL CITRATE 50 UG/ML
INJECTION, SOLUTION INTRAMUSCULAR; INTRAVENOUS AS NEEDED
Status: DISCONTINUED | OUTPATIENT
Start: 2024-02-06 | End: 2024-02-06 | Stop reason: SURG

## 2024-02-06 RX ORDER — LIDOCAINE HYDROCHLORIDE 20 MG/ML
INJECTION, SOLUTION EPIDURAL; INFILTRATION; INTRACAUDAL; PERINEURAL AS NEEDED
Status: DISCONTINUED | OUTPATIENT
Start: 2024-02-06 | End: 2024-02-06 | Stop reason: SURG

## 2024-02-06 RX ORDER — DIPHENHYDRAMINE HYDROCHLORIDE 50 MG/ML
12.5 INJECTION INTRAMUSCULAR; INTRAVENOUS
Status: DISCONTINUED | OUTPATIENT
Start: 2024-02-06 | End: 2024-02-06 | Stop reason: HOSPADM

## 2024-02-06 RX ORDER — ROCURONIUM BROMIDE 10 MG/ML
INJECTION, SOLUTION INTRAVENOUS AS NEEDED
Status: DISCONTINUED | OUTPATIENT
Start: 2024-02-06 | End: 2024-02-06 | Stop reason: SURG

## 2024-02-06 RX ORDER — BUPIVACAINE HYDROCHLORIDE 2.5 MG/ML
INJECTION, SOLUTION EPIDURAL; INFILTRATION; INTRACAUDAL AS NEEDED
Status: DISCONTINUED | OUTPATIENT
Start: 2024-02-06 | End: 2024-02-06 | Stop reason: HOSPADM

## 2024-02-06 RX ORDER — FENTANYL CITRATE 50 UG/ML
50 INJECTION, SOLUTION INTRAMUSCULAR; INTRAVENOUS
Status: DISCONTINUED | OUTPATIENT
Start: 2024-02-06 | End: 2024-02-06 | Stop reason: HOSPADM

## 2024-02-06 RX ORDER — ONDANSETRON 2 MG/ML
4 INJECTION INTRAMUSCULAR; INTRAVENOUS ONCE AS NEEDED
Status: COMPLETED | OUTPATIENT
Start: 2024-02-06 | End: 2024-02-06

## 2024-02-06 RX ORDER — NALOXONE HCL 0.4 MG/ML
0.4 VIAL (ML) INJECTION AS NEEDED
Status: DISCONTINUED | OUTPATIENT
Start: 2024-02-06 | End: 2024-02-06 | Stop reason: HOSPADM

## 2024-02-06 RX ORDER — ESMOLOL HYDROCHLORIDE 10 MG/ML
INJECTION INTRAVENOUS AS NEEDED
Status: DISCONTINUED | OUTPATIENT
Start: 2024-02-06 | End: 2024-02-06 | Stop reason: SURG

## 2024-02-06 RX ORDER — HYDRALAZINE HYDROCHLORIDE 20 MG/ML
5 INJECTION INTRAMUSCULAR; INTRAVENOUS
Status: DISCONTINUED | OUTPATIENT
Start: 2024-02-06 | End: 2024-02-06 | Stop reason: HOSPADM

## 2024-02-06 RX ADMIN — DEXMEDETOMIDINE HYDROCHLORIDE 4 MCG: 100 INJECTION, SOLUTION INTRAVENOUS at 13:28

## 2024-02-06 RX ADMIN — PROPOFOL 200 MG: 10 INJECTION, EMULSION INTRAVENOUS at 12:58

## 2024-02-06 RX ADMIN — ONDANSETRON 4 MG: 2 INJECTION INTRAMUSCULAR; INTRAVENOUS at 13:26

## 2024-02-06 RX ADMIN — ROCURONIUM BROMIDE 10 MG: 10 INJECTION, SOLUTION INTRAVENOUS at 13:15

## 2024-02-06 RX ADMIN — ONDANSETRON 4 MG: 2 INJECTION INTRAMUSCULAR; INTRAVENOUS at 14:38

## 2024-02-06 RX ADMIN — GLUCAGON 1 MG: KIT at 13:36

## 2024-02-06 RX ADMIN — FENTANYL CITRATE 50 MCG: 50 INJECTION, SOLUTION INTRAMUSCULAR; INTRAVENOUS at 15:06

## 2024-02-06 RX ADMIN — ESMOLOL HYDROCHLORIDE 5 MG: 100 INJECTION, SOLUTION INTRAVENOUS at 13:40

## 2024-02-06 RX ADMIN — SODIUM CHLORIDE, POTASSIUM CHLORIDE, SODIUM LACTATE AND CALCIUM CHLORIDE 250 ML/HR: 600; 310; 30; 20 INJECTION, SOLUTION INTRAVENOUS at 04:34

## 2024-02-06 RX ADMIN — SODIUM CHLORIDE, POTASSIUM CHLORIDE, SODIUM LACTATE AND CALCIUM CHLORIDE 250 ML/HR: 600; 310; 30; 20 INJECTION, SOLUTION INTRAVENOUS at 00:12

## 2024-02-06 RX ADMIN — CEFAZOLIN 2 G: 2 INJECTION, POWDER, FOR SOLUTION INTRAMUSCULAR; INTRAVENOUS at 13:00

## 2024-02-06 RX ADMIN — HYDROCODONE BITARTRATE AND ACETAMINOPHEN 1 TABLET: 5; 325 TABLET ORAL at 06:26

## 2024-02-06 RX ADMIN — FENTANYL CITRATE 50 MCG: 50 INJECTION, SOLUTION INTRAMUSCULAR; INTRAVENOUS at 12:58

## 2024-02-06 RX ADMIN — DEXAMETHASONE SODIUM PHOSPHATE 8 MG: 4 INJECTION, SOLUTION INTRAMUSCULAR; INTRAVENOUS at 13:03

## 2024-02-06 RX ADMIN — ENOXAPARIN SODIUM 40 MG: 100 INJECTION SUBCUTANEOUS at 16:36

## 2024-02-06 RX ADMIN — ROCURONIUM BROMIDE 40 MG: 10 INJECTION, SOLUTION INTRAVENOUS at 12:58

## 2024-02-06 RX ADMIN — HYDROMORPHONE HYDROCHLORIDE 0.25 MG: 1 INJECTION, SOLUTION INTRAMUSCULAR; INTRAVENOUS; SUBCUTANEOUS at 13:38

## 2024-02-06 RX ADMIN — ONDANSETRON 4 MG: 2 INJECTION INTRAMUSCULAR; INTRAVENOUS at 06:26

## 2024-02-06 RX ADMIN — LIDOCAINE HYDROCHLORIDE 100 MG: 20 INJECTION, SOLUTION EPIDURAL; INFILTRATION; INTRACAUDAL; PERINEURAL at 12:58

## 2024-02-06 RX ADMIN — FENTANYL CITRATE 50 MCG: 50 INJECTION, SOLUTION INTRAMUSCULAR; INTRAVENOUS at 13:08

## 2024-02-06 RX ADMIN — DEXMEDETOMIDINE HYDROCHLORIDE 4 MCG: 100 INJECTION, SOLUTION INTRAVENOUS at 13:20

## 2024-02-06 RX ADMIN — HYDROCODONE BITARTRATE AND ACETAMINOPHEN 1 TABLET: 5; 325 TABLET ORAL at 18:00

## 2024-02-06 RX ADMIN — HYDROMORPHONE HYDROCHLORIDE 0.75 MG: 1 INJECTION, SOLUTION INTRAMUSCULAR; INTRAVENOUS; SUBCUTANEOUS at 13:55

## 2024-02-06 RX ADMIN — DEXMEDETOMIDINE HYDROCHLORIDE 2 MCG: 100 INJECTION, SOLUTION INTRAVENOUS at 13:31

## 2024-02-06 RX ADMIN — PROPOFOL 25 MCG/KG/MIN: 10 INJECTION, EMULSION INTRAVENOUS at 13:01

## 2024-02-06 RX ADMIN — MIDAZOLAM HYDROCHLORIDE 2 MG: 2 INJECTION, SOLUTION INTRAMUSCULAR; INTRAVENOUS at 12:49

## 2024-02-06 RX ADMIN — SODIUM CHLORIDE, SODIUM LACTATE, POTASSIUM CHLORIDE, AND CALCIUM CHLORIDE: .6; .31; .03; .02 INJECTION, SOLUTION INTRAVENOUS at 12:50

## 2024-02-06 NOTE — PROGRESS NOTES
[facility] MEDICINE SERVICE  DAILY PROGRESS NOTE    NAME: Magaly Aggarwal  : 1985  MRN: [id number]      LOS: 2 days     PROVIDER OF SERVICE: Carlos Llanes Alvarez, MD    Chief Complaint: Acute pancreatitis    Subjective:     Interval History:  History taken from: patient  Patient Complaints: diarrhea, mild epigastric pain.  Patient Denies:  Melena    Patient is currently being managed for acute gallstone pancreatitis.  She is reporting multiple episodes of watery diarrhea since yesterday.  Was intermittently tachycardic throughout the day, which improved after she was given isotonic IV fluid boluses.  Patient is in agreement and going for cholecystectomy tomorrow.  She reports her epigastric pain has significantly improved.  She tolerated clear liquids well.  She has been refusing prophylactic heparin.    No other major overnight events.    Review of Systems:   Review of Systems   Constitutional:  Positive for fatigue. Negative for chills and diaphoresis.   HENT:  Negative for drooling, facial swelling and sinus pain.    Eyes:  Negative for pain.   Respiratory:  Negative for chest tightness and shortness of breath.    Cardiovascular:  Negative for leg swelling.   Gastrointestinal:  Positive for abdominal pain and diarrhea. Negative for nausea.   Endocrine: Negative for polydipsia.   Genitourinary:  Negative for dyspareunia, flank pain, hematuria and urgency.   Neurological:  Negative for seizures and headaches.   Psychiatric/Behavioral:  Negative for confusion and hallucinations.        Objective:     Vital Signs  Temp:  [98 °F (36.7 °C)-99.9 °F (37.7 °C)] 99.1 °F (37.3 °C)  Heart Rate:  [] 97  Resp:  [16-18] 17  BP: (118-130)/(77-89) 125/84   Body mass index is 25.83 kg/m².      Physical Exam  Constitutional:       Appearance: Normal appearance.   HENT:      Head: Normocephalic.      Mouth/Throat:      Mouth: Mucous membranes are dry.   Eyes:      Extraocular Movements: Extraocular  movements intact.      Pupils: Pupils are equal, round, and reactive to light.   Cardiovascular:      Rate and Rhythm: Tachycardia present.      Pulses: Normal pulses.      Heart sounds: Normal heart sounds.   Pulmonary:      Breath sounds: Normal breath sounds.   Abdominal:      General: Abdomen is flat.      Palpations: Abdomen is soft.      Tenderness: There is no abdominal tenderness. There is no guarding or rebound.      Comments: Mild epigastric tenderness.    Musculoskeletal:         General: Normal range of motion.      Cervical back: Neck supple.   Skin:     General: Skin is dry.      Capillary Refill: Capillary refill takes less than 2 seconds.   Neurological:      General: No focal deficit present.      Mental Status: She is alert and oriented to person, place, and time.         Scheduled Meds   enoxaparin, 40 mg, Subcutaneous, Q24H  potassium chloride, 40 mEq, Oral, Daily  senna-docusate sodium, 2 tablet, Oral, BID       PRN Meds     senna-docusate sodium **AND** polyethylene glycol **AND** bisacodyl **AND** bisacodyl    HYDROcodone-acetaminophen    Morphine    ondansetron    sodium chloride   Infusions  lactated ringers, 250 mL/hr, Last Rate: 250 mL/hr (02/05/24 1948)          Diagnostic Data    Results from last 7 days   Lab Units 02/05/24  0635   WBC 10*3/mm3 10.70   HEMOGLOBIN g/dL 12.5   HEMATOCRIT % 38.2   PLATELETS 10*3/mm3 261   GLUCOSE mg/dL 123*   CREATININE mg/dL 0.50*   BUN mg/dL 4*   SODIUM mmol/L 132*   POTASSIUM mmol/L 3.2*   AST (SGOT) U/L 39*   ALT (SGPT) U/L 270*   ALK PHOS U/L 318*   BILIRUBIN mg/dL 0.6   ANION GAP mmol/L 10.0       MRI abdomen w wo contrast mrcp    Result Date: 2/4/2024  Impression: 1. Relatively mild peripancreatic edema at the level of the pancreatic tail and adjacent pancreatic body consistent with pancreatitis and similar to previous CT scan. 2. Multiple small gallstones. No visible gallbladder inflammation. 3. Common hepatic duct and proximal common bile duct at  upper limits of normal diameter. No visible common duct stone. Electronically Signed: Amaury Valadez MD  2/4/2024 2:37 PM EST  Workstation ID: [id number]       I reviewed the patient's new clinical results.    Assessment/Plan:     Active and Resolved Problems  Active Hospital Problems    Diagnosis  POA    **Acute pancreatitis [K85.90]  Yes      Resolved Hospital Problems   No resolved problems to display.     Acute gallstone pancreatitis  Acute diarrhea  Patient in agreement with going for laparoscopic tomorrow  Her pain is well-controlled  Continue  mL/h for the next 12 hours then reassess.  N.p.o. at midnight  Continue Norco every 6 as needed for moderate pain  IV morphine for severe pain  Strict intake and output  Management as per surgery team      DVT prophylaxis:  Medical and mechanical DVT prophylaxis orders are present.         Code status is   Code Status and Medical Interventions:   Ordered at: 02/03/24 0152     Code Status (Patient has no pulse and is not breathing):    CPR (Attempt to Resuscitate)     Medical Interventions (Patient has pulse or is breathing):    Full Support       Plan for disposition:home in 2 days    Time: 30 minutes    Signature: Electronically signed by Carlos Llanes Alvarez, MD, 02/05/24, 20:08 EST.  [facility]ist Team

## 2024-02-06 NOTE — ANESTHESIA PREPROCEDURE EVALUATION
Anesthesia Evaluation     Patient summary reviewed and Nursing notes reviewed   no history of anesthetic complications:   NPO Solid Status: > 8 hours  NPO Liquid Status: > 8 hours           Airway   Mallampati: II  TM distance: >3 FB  Neck ROM: full  Dental - normal exam     Pulmonary - normal exam   (-) not a smoker  Cardiovascular - negative cardio ROS and normal exam    (-) angina      Neuro/Psych- negative ROS  GI/Hepatic/Renal/Endo      ROS Comment: Gallstone pancreatitis    Musculoskeletal (-) negative ROS    Abdominal    Substance History - negative use     OB/GYN      Comment: 4 months post partum      Other - negative ROS                     Anesthesia Plan    ASA 2     general     intravenous induction     Anesthetic plan, risks, benefits, and alternatives have been provided, discussed and informed consent has been obtained with: patient.    Plan discussed with CRNA and CAA.    CODE STATUS:    Code Status (Patient has no pulse and is not breathing): CPR (Attempt to Resuscitate)  Medical Interventions (Patient has pulse or is breathing): Full Support

## 2024-02-06 NOTE — CODE DOCUMENTATION
Social Work Assessment  [facility]     Patient Name: Magaly Aggarwal  MRN: [id number]  Today's Date: 2/6/2024    Admit Date: 2/2/2024     Discharge Plan       Row Name 02/06/24 1502       Plan    Plan Need CM screen.    Plan Comments Attempted to see patient, however SENAIT for surgery. DC barriers: cholecystectomy 2/6             MARYANN McneilW, MSSW, CCM    Phone: [phone]  Cell: [phone]  Fax: [phone]  [email or web address]

## 2024-02-06 NOTE — PLAN OF CARE
Goal Outcome Evaluation:                 Pt's pain much better today. Plan for surgery tomorrow. No abdominal tenderness today. Doing well.

## 2024-02-06 NOTE — OP NOTE
Operative Report:    Patient Name:  Magaly Aggarwal  YOB: 1985    Date of Surgery:  2/6/2024     Indications: 38-year-old lady admitted for epigastric abdominal pain determined to have gallstone pancreatitis.  I was asked to see her to consider cholecystectomy for prevention of recurrent pancreatitis.  Counseled about the risk benefits of the operation she understood the risk and wished to proceed.    Pre-op Diagnosis:   gallstone pancreatitis       Post-Op Diagnosis Codes:     * Gallstone pancreatitis [K85.10]    Procedure/CPT® Codes:      Procedure(s):  CHOLECYSTECTOMY LAPAROSCOPIC INTRAOPERATIVE CHOLANGIOGRAM    Staff:  Surgeon(s):  Jabier Benson MD    Circulator: Jagjit Nicole RN; Elizabeth Garcia RN  Radiology Technologist: Pao Merino  Scrub Person: Bere Olivares  Vendor Representative: Dc Lindsey  Assistant: Zaheer Best CSFA  was responsible for performing the following activities: Retraction, Closing, and Held/Positioned Camera and their skilled assistance was necessary for the success of this case.        Anesthesia: General    Estimated Blood Loss: 25 mL    Implants:    Implant Name Type Inv. Item Serial No.  Lot No. LRB No. Used Action   CLIPAPPLR M/ ENDO [id number] 5MM 33CM MD/TARI - [id number] Implant CLIPAPPLR M/ ENDO LIGAMAX5 5MM 33CM MD/TARI  [employer]  DIV OF J AND J [id number] N/A 1 Implanted       Specimen:          Specimens       ID Source Type Tests Collected By Collected At Frozen?    A Gallbladder Tissue TISSUE PATHOLOGY EXAM   Jabier Benson MD 2/6/24 1518                 Findings: Intraoperative cholangiogram demonstrating access within the gallbladder lumen passage of contrast through the cystic duct into the common bile duct and backfilling into the common hepatic duct and right and left branches.  Additionally I did see what appeared to be either a bubble or gallstone in the upper biliary tree subsequently that  migrated distally into the ampulla.  Glucagon was given and then a flush of saline was given, I was able to feel a release of pressure and the opacification the bile duct resolved and there was smooth passage through the distal bile duct.  Occlusion completion cholangiogram did not show any additional filling defects identified.    Complications: None    Description of Procedure: Patient was taken to the operating placed in the operating table in supine position under general anesthesia.  Her abdomen was prepped and draped normal sterile fashion.  Timeout was performed identifying correct patient procedure and site of operation.  I began the operation Mitran the abdomen through a left upper quadrant 5 mm optical trocar entry.  Upon entry the abdominal cavity the abdomen was insufflated camera to do there is no evidence of injury to underlying structures.  She was placed in reverse Trendelenburg with the right side up.  A periumbilical 12 and 2 right subcostal 5 mm ports placed under laparoscopic guidance.  The gallbladder was grasped and retracted towards the right upper quadrant infundibulum is retracted laterally.  The infundibulum had a bit of an intrahepatic component to it and so dissection was a bit tedious.  I was able to get the base the gallbladder free and worked my way distally down to the cystic duct.  This level of dissection the cystic artery was likely teased away from the gallbladder body itself and was not clipped.  I placed the Springer clamp across the body of the gallbladder obtained the cholangiogram as above.  I did initially see a filling defect which was cleared with glucagon and saline.  The completion cholangiogram did not show any other evidence of bile duct stones and there was smooth passage of contrast into the duodenum.  There was some backfilling of the pancreatic duct.    Once the cholangiogram was completed the cystic duct was doubly clipped and divided.  The gallbladder was dissected  off the liver edge with the Bovie without any significant bile spillage and minimal bleeding.  It was placed in Endo Catch bag and removed.  Right upper quadrant was thoroughly inspected.  I introduced a Ray-Pablo to evacuate some contrast from the upper quadrant the Ray-Pablo was then removed.  The clips were in good position the dissection beds were hemostatic.  The 12 mm port site was closed with 0 Vicryl suture and suture passer the abdomen is desufflated skin was closed with 4-0 Vicryl suture and skin affix.  She tolerated procedure well's been transferred to recovery in satisfactory condition.      Jabier Benson MD     Date: 2/6/2024  Time: 14:19 EST    This note was created using Dragon Voice Recognition software.

## 2024-02-06 NOTE — ANESTHESIA PROCEDURE NOTES
Airway  Urgency: elective    Date/Time: 2/6/2024 1:01 PM  Airway not difficult    General Information and Staff    Patient location during procedure: OR  Anesthesiologist: Ekaterina Brown MD  CRNA/CAA: Sweetie Cool CRNA    Indications and Patient Condition  Indications for airway management: airway protection    Preoxygenated: yes (pt pre-O2 with 100% O2)  MILS not maintained throughout  Mask difficulty assessment: 2 - vent by mask + OA or adjuvant +/- NMBA (easy BMV )    Final Airway Details  Final airway type: endotracheal airway      Successful airway: ETT  Cuffed: yes   Successful intubation technique: direct laryngoscopy  Endotracheal tube insertion site: oral  Blade: Hiren  Blade size: 3  ETT size (mm): 7.0  Cormack-Lehane Classification: grade I - full view of glottis  Placement verified by: chest auscultation and capnometry   Cuff volume (mL): 7  Measured from: lips  ETT/EBT  to lips (cm): 22  Number of attempts at approach: 1    Additional Comments  ATOETx1. No change in dentition.

## 2024-02-07 VITALS
TEMPERATURE: 97.8 F | DIASTOLIC BLOOD PRESSURE: 83 MMHG | SYSTOLIC BLOOD PRESSURE: 141 MMHG | WEIGHT: 220.4 LBS | HEART RATE: 94 BPM | BODY MASS INDEX: 31.55 KG/M2 | HEIGHT: 70 IN | OXYGEN SATURATION: 97 % | RESPIRATION RATE: 18 BRPM

## 2024-02-07 PROBLEM — K85.10 ACUTE GALLSTONE PANCREATITIS: Status: ACTIVE | Noted: 2024-02-03

## 2024-02-07 PROBLEM — Z90.49 S/P LAPAROSCOPIC CHOLECYSTECTOMY: Status: ACTIVE | Noted: 2024-02-07

## 2024-02-07 PROBLEM — K85.10 ACUTE GALLSTONE PANCREATITIS: Status: RESOLVED | Noted: 2024-02-03 | Resolved: 2024-02-07

## 2024-02-07 PROCEDURE — 99024 POSTOP FOLLOW-UP VISIT: CPT | Performed by: SURGERY

## 2024-02-07 RX ORDER — ONDANSETRON 4 MG/1
4 TABLET, FILM COATED ORAL EVERY 8 HOURS PRN
Qty: 30 TABLET | Refills: 0 | Status: SHIPPED | OUTPATIENT
Start: 2024-02-07

## 2024-02-07 RX ADMIN — HYDROCODONE BITARTRATE AND ACETAMINOPHEN 1 TABLET: 5; 325 TABLET ORAL at 08:41

## 2024-02-07 RX ADMIN — HYDROCODONE BITARTRATE AND ACETAMINOPHEN 1 TABLET: 5; 325 TABLET ORAL at 00:07

## 2024-02-07 NOTE — ANESTHESIA POSTPROCEDURE EVALUATION
Patient: Magaly Aggarwal    Procedure Summary       Date: 02/06/24 Room / Location: [facility] OR [address] [place] [address] MAIN OR    Anesthesia Start: 1250 Anesthesia Stop: 1413    Procedure: CHOLECYSTECTOMY LAPAROSCOPIC INTRAOPERATIVE CHOLANGIOGRAM (Abdomen) Diagnosis:       Gallstone pancreatitis      (gallstone pancreatitis)    Surgeons: Jabier Benson MD Provider: Ekaterina Brown MD    Anesthesia Type: general ASA Status: 2            Anesthesia Type: general    Vitals  Vitals Value Taken Time   /79 02/06/24 1525   Temp 98 °F (36.7 °C) 02/06/24 1525   Pulse 71 02/06/24 1525   Resp 12 02/06/24 1525   SpO2 94 % 02/06/24 1525           Post Anesthesia Care and Evaluation    Patient location during evaluation: PACU  Patient participation: complete - patient participated  Level of consciousness: awake and alert  Pain management: satisfactory to patient    Airway patency: patent  Anesthetic complications: No anesthetic complications  PONV Status: none  Cardiovascular status: acceptable  Respiratory status: acceptable  Hydration status: acceptable

## 2024-02-07 NOTE — PAYOR COMM NOTE
"Magaly Mason (38 y.o. Female)       Date of Birth   1985    Social Security Number       Address   [address] [place] [facility]    Home Phone   [phone]    MRN   [id number]       [place]   Holiness    Marital Status                               Admission Date   2/2/24    Admission Type   Emergency    Admitting Provider   Mine Cifuentes MD    Attending Provider   Hansel Sim DO    Department, Room/Bed   [facility] ALEJANDRO MOTHER BABY, M406/1       Discharge Date       Discharge Disposition   Home or Self Care    Discharge Destination                                 Attending Provider: Hansel Sim DO    Allergies: No Known Allergies    Isolation: None   Infection: None   Code Status: CPR    Ht: 177.8 cm (70\")   Wt: 100 kg (220 lb 6.4 oz)    Admission Cmt: None   Principal Problem: Acute gallstone pancreatitis [K85.10]                   Active Insurance as of 2/2/2024       Primary Coverage       Payor Plan Insurance Group Employer/Plan Group    [employer]HEM BLUE CROSS [facility] [employer] BLUE SHIELD PPO [id number]       Payor Plan Address Payor Plan Phone Number Payor Plan Fax Number Effective Dates    [address] [phone]  8/1/2023 - None Entered    [facility] [place] [id number]         Subscriber Name Subscriber Birth Date Member ID       MAGALY MASON 1985 [id number]                     Emergency Contacts        (Rel.) Home Phone Work Phone Mobile Phone    AJ MASON (Spouse) -- -- [phone]                 Operative/Procedure Notes (last 48 hours)        Jabier Benson MD at 02/06/24 1312          Operative Report:    Patient Name:  Magaly Mason  YOB: 1985    Date of Surgery:  2/6/2024     Indications: 38-year-old lady admitted for epigastric abdominal pain determined to have gallstone pancreatitis.  I was asked to see her to consider cholecystectomy for prevention of recurrent " pancreatitis.  Counseled about the risk benefits of the operation she understood the risk and wished to proceed.    Pre-op Diagnosis:   gallstone pancreatitis       Post-Op Diagnosis Codes:     * Gallstone pancreatitis [K85.10]    Procedure/CPT® Codes:      Procedure(s):  CHOLECYSTECTOMY LAPAROSCOPIC INTRAOPERATIVE CHOLANGIOGRAM    Staff:  Surgeon(s):  Jabier Benson MD    Circulator: Jagjit Nicole RN; Elizabeth Garcia RN  Radiology Technologist: Pao Merino  Scrub Person: Bere Olivares  Vendor Representative: Dc Lindsey  Assistant: Zaheer Best CSFA  was responsible for performing the following activities: Retraction, Closing, and Held/Positioned Camera and their skilled assistance was necessary for the success of this case.        Anesthesia: General    Estimated Blood Loss: 25 mL    Implants:    Implant Name Type Inv. Item Serial No.  Lot No. LRB No. Used Action   CLIPAPPLR M/ ENDO [id number] 5MM 33CM MD/TARI - [id number] Implant CLIPAPPLR M/ ENDO LIGAMAX5 5MM 33CM MD/TARI  [employer]  DIV OF J AND J [id number] N/A 1 Implanted       Specimen:          Specimens       ID Source Type Tests Collected By Collected At Frozen?    A Gallbladder Tissue TISSUE PATHOLOGY EXAM   Jabier Benson MD 2/6/24 7767                 Findings: Intraoperative cholangiogram demonstrating access within the gallbladder lumen passage of contrast through the cystic duct into the common bile duct and backfilling into the common hepatic duct and right and left branches.  Additionally I did see what appeared to be either a bubble or gallstone in the upper biliary tree subsequently that migrated distally into the ampulla.  Glucagon was given and then a flush of saline was given, I was able to feel a release of pressure and the opacification the bile duct resolved and there was smooth passage through the distal bile duct.  Occlusion completion cholangiogram did not show any additional filling defects  identified.    Complications: None    Description of Procedure: Patient was taken to the operating placed in the operating table in supine position under general anesthesia.  Her abdomen was prepped and draped normal sterile fashion.  Timeout was performed identifying correct patient procedure and site of operation.  I began the operation Mitran the abdomen through a left upper quadrant 5 mm optical trocar entry.  Upon entry the abdominal cavity the abdomen was insufflated camera to do there is no evidence of injury to underlying structures.  She was placed in reverse Trendelenburg with the right side up.  A periumbilical 12 and 2 right subcostal 5 mm ports placed under laparoscopic guidance.  The gallbladder was grasped and retracted towards the right upper quadrant infundibulum is retracted laterally.  The infundibulum had a bit of an intrahepatic component to it and so dissection was a bit tedious.  I was able to get the base the gallbladder free and worked my way distally down to the cystic duct.  This level of dissection the cystic artery was likely teased away from the gallbladder body itself and was not clipped.  I placed the Springer clamp across the body of the gallbladder obtained the cholangiogram as above.  I did initially see a filling defect which was cleared with glucagon and saline.  The completion cholangiogram did not show any other evidence of bile duct stones and there was smooth passage of contrast into the duodenum.  There was some backfilling of the pancreatic duct.    Once the cholangiogram was completed the cystic duct was doubly clipped and divided.  The gallbladder was dissected off the liver edge with the Bovie without any significant bile spillage and minimal bleeding.  It was placed in Endo Catch bag and removed.  Right upper quadrant was thoroughly inspected.  I introduced a Ray-Pablo to evacuate some contrast from the upper quadrant the Ray-Pablo was then removed.  The clips were in good  position the dissection beds were hemostatic.  The 12 mm port site was closed with 0 Vicryl suture and suture passer the abdomen is desufflated skin was closed with 4-0 Vicryl suture and skin affix.  She tolerated procedure well's been transferred to recovery in satisfactory condition.      Jabier Benson MD     Date: 2024  Time: 14:19 EST    This note was created using Dragon Voice Recognition software.    Electronically signed by Jabier Benson MD at 24 1424          Physician Progress Notes (last 48 hours)        Jabier Benson MD at 24 0846          General Surgery Progress Note    Name: Magaly Aggarwal ADMIT: 2024   : 1985  PCP: Tammi Ny MD    MRN: [id number] LOS: 4 days   AGE/SEX: 38 y.o. female  ROOM: [address]    Chief Complaint   Patient presents with    Abdominal Pain     Subjective     38 y.o. female who is status post laparoscopic cholecystectomy with intraoperative cholangiogram on  for gallstone pancreatitis    Feels good today.  No nausea or vomiting tolerating some diet.  Incisional pain is mild.    Objective     Scheduled Medications:   enoxaparin, 40 mg, Subcutaneous, Q24H  potassium chloride, 40 mEq, Oral, Daily        Active Infusions:  lactated ringers, 1,000 mL, Last Rate: Stopped (24 1402)        As Needed Medications:    HYDROcodone-acetaminophen    Morphine    ondansetron    sodium chloride    Vital Signs  Vital Signs Patient Vitals for the past 24 hrs:   BP Temp Temp src Pulse Resp SpO2 Weight   24 0840 141/83 97.8 °F (36.6 °C) Oral 94 18 97 % --   24 0309 111/65 97.4 °F (36.3 °C) Oral 69 15 96 % --   24 2317 114/78 98.3 °F (36.8 °C) Oral 97 17 97 % --   240 -- -- -- -- -- -- 100 kg (220 lb 6.4 oz)   24 1933 137/84 97.8 °F (36.6 °C) Oral 98 17 95 % --   24 1802 -- -- -- 78 -- 97 % --   24 1700 -- -- -- 77 -- 98 % --   24 1640 -- 97.9 °F (36.6 °C) Oral 92 18 97 % --    02/06/24 1545 128/88 97.5 °F (36.4 °C) Oral 84 17 97 % --   02/06/24 1525 123/79 98 °F (36.7 °C) Oral 71 12 94 % --   02/06/24 1510 117/72 -- -- 78 13 93 % --   02/06/24 1455 106/73 -- -- 79 14 99 % --   02/06/24 1440 110/77 -- -- 81 13 97 % --   02/06/24 1425 129/72 -- -- 87 15 98 % --   02/06/24 1420 123/77 -- -- -- 17 -- --   02/06/24 1415 118/70 -- -- -- 15 -- --   02/06/24 1410 123/77 97.6 °F (36.4 °C) Oral -- 18 -- --   02/06/24 1223 134/58 98.1 °F (36.7 °C) -- 86 11 96 % --   02/06/24 1127 -- 98.3 °F (36.8 °C) Oral 95 20 99 % --       Physical Exam:  Physical Exam  Constitutional:       Appearance: Normal appearance.   HENT:      Head: Normocephalic and atraumatic.   Eyes:      General: No scleral icterus.     Conjunctiva/sclera: Conjunctivae normal.   Pulmonary:      Effort: Pulmonary effort is normal. No respiratory distress.   Abdominal:      Comments: Abdomen is soft appropriate tender to palpation some mild bruising at the incisions   Neurological:      Mental Status: She is alert.         Results Review:     CBC    Results from last 7 days   Lab Units 02/06/24  0528 02/05/24  0635 02/04/24  0904 02/03/24  0531 02/02/24  2351   WBC 10*3/mm3 11.10* 10.70 9.50 10.40 15.60*   HEMOGLOBIN g/dL 12.2 12.5 13.0 14.9 15.1   PLATELETS 10*3/mm3 262 261 264 291 334     CMP   Results from last 7 days   Lab Units 02/06/24  1242 02/05/24  0635 02/04/24  0904 02/03/24  0717 02/02/24  2351   SODIUM mmol/L 138 132* 138 142 140   POTASSIUM mmol/L 3.6 3.2* 3.6 3.8 3.9   CHLORIDE mmol/L 103 98 101 107 102   CO2 mmol/L 25.0 24.0 21.0* 20.0* 21.0*   BUN mg/dL 3* 4* 7 10 13   CREATININE mg/dL 0.48* 0.50* 0.49* 0.62 0.67   GLUCOSE mg/dL 100* 123* 70 108* 127*   ALBUMIN g/dL 3.7 3.9 4.0 4.2 4.8   BILIRUBIN mg/dL 0.5 0.6 0.8 1.3* 2.8*   ALK PHOS U/L 348* 318* 334* 405* 487*   AST (SGOT) U/L 39* 39* 68* 387* 759*   ALT (SGPT) U/L 191* 270* 409* 821* 1,073*   LIPASE U/L  --  25 94* 1,986* >3,000*       I reviewed the patient's new  clinical results.    Assessment & Plan       Acute pancreatitis      38 y.o. female status post laparoscopic cholecystectomy intraoperative cholangiogram for gallstone pancreatitis    Looks good from my standpoint.  Surgery related follow-up instructions have been left in the chart.  I went ahead and sent her pain prescription over to her pharmacy.        This note was created using Dragon Voice Recognition software.    Jabier Benson MD  24  08:46 EST    Electronically signed by Jabier Benson MD at 24 0847       Hansel Sim DO at 24 1407          Pt not seen today 2/2 going to OR.  Will f/u tomorrow.    Electronically signed by Hansel Sim DO at 24 1408       Alvarez, Carlos Llanes, MD at 24              [facility] MEDICINE SERVICE  DAILY PROGRESS NOTE    NAME: Magaly Aggarwal  : 1985  MRN: [id number]      LOS: 2 days     PROVIDER OF SERVICE: Carlos Llanes Alvarez, MD    Chief Complaint: Acute pancreatitis    Subjective:     Interval History:  History taken from: patient  Patient Complaints: diarrhea, mild epigastric pain.  Patient Denies:  Melena    Patient is currently being managed for acute gallstone pancreatitis.  She is reporting multiple episodes of watery diarrhea since yesterday.  Was intermittently tachycardic throughout the day, which improved after she was given isotonic IV fluid boluses.  Patient is in agreement and going for cholecystectomy tomorrow.  She reports her epigastric pain has significantly improved.  She tolerated clear liquids well.  She has been refusing prophylactic heparin.    No other major overnight events.    Review of Systems:   Review of Systems   Constitutional:  Positive for fatigue. Negative for chills and diaphoresis.   HENT:  Negative for drooling, facial swelling and sinus pain.    Eyes:  Negative for pain.   Respiratory:  Negative for chest tightness and shortness of breath.     Cardiovascular:  Negative for leg swelling.   Gastrointestinal:  Positive for abdominal pain and diarrhea. Negative for nausea.   Endocrine: Negative for polydipsia.   Genitourinary:  Negative for dyspareunia, flank pain, hematuria and urgency.   Neurological:  Negative for seizures and headaches.   Psychiatric/Behavioral:  Negative for confusion and hallucinations.        Objective:     Vital Signs  Temp:  [98 °F (36.7 °C)-99.9 °F (37.7 °C)] 99.1 °F (37.3 °C)  Heart Rate:  [] 97  Resp:  [16-18] 17  BP: (118-130)/(77-89) 125/84   Body mass index is 25.83 kg/m².      Physical Exam  Constitutional:       Appearance: Normal appearance.   HENT:      Head: Normocephalic.      Mouth/Throat:      Mouth: Mucous membranes are dry.   Eyes:      Extraocular Movements: Extraocular movements intact.      Pupils: Pupils are equal, round, and reactive to light.   Cardiovascular:      Rate and Rhythm: Tachycardia present.      Pulses: Normal pulses.      Heart sounds: Normal heart sounds.   Pulmonary:      Breath sounds: Normal breath sounds.   Abdominal:      General: Abdomen is flat.      Palpations: Abdomen is soft.      Tenderness: There is no abdominal tenderness. There is no guarding or rebound.      Comments: Mild epigastric tenderness.    Musculoskeletal:         General: Normal range of motion.      Cervical back: Neck supple.   Skin:     General: Skin is dry.      Capillary Refill: Capillary refill takes less than 2 seconds.   Neurological:      General: No focal deficit present.      Mental Status: She is alert and oriented to person, place, and time.         Scheduled Meds   enoxaparin, 40 mg, Subcutaneous, Q24H  potassium chloride, 40 mEq, Oral, Daily  senna-docusate sodium, 2 tablet, Oral, BID       PRN Meds     senna-docusate sodium **AND** polyethylene glycol **AND** bisacodyl **AND** bisacodyl    HYDROcodone-acetaminophen    Morphine    ondansetron    sodium chloride   Infusions  lactated ringers, 250  mL/hr, Last Rate: 250 mL/hr (02/05/24 1948)          Diagnostic Data    Results from last 7 days   Lab Units 02/05/24  0635   WBC 10*3/mm3 10.70   HEMOGLOBIN g/dL 12.5   HEMATOCRIT % 38.2   PLATELETS 10*3/mm3 261   GLUCOSE mg/dL 123*   CREATININE mg/dL 0.50*   BUN mg/dL 4*   SODIUM mmol/L 132*   POTASSIUM mmol/L 3.2*   AST (SGOT) U/L 39*   ALT (SGPT) U/L 270*   ALK PHOS U/L 318*   BILIRUBIN mg/dL 0.6   ANION GAP mmol/L 10.0       MRI abdomen w wo contrast mrcp    Result Date: 2/4/2024  Impression: 1. Relatively mild peripancreatic edema at the level of the pancreatic tail and adjacent pancreatic body consistent with pancreatitis and similar to previous CT scan. 2. Multiple small gallstones. No visible gallbladder inflammation. 3. Common hepatic duct and proximal common bile duct at upper limits of normal diameter. No visible common duct stone. Electronically Signed: Amaury Valadez MD  2/4/2024 2:37 PM EST  Workstation ID: [id number]       I reviewed the patient's new clinical results.    Assessment/Plan:     Active and Resolved Problems  Active Hospital Problems    Diagnosis  POA    **Acute pancreatitis [K85.90]  Yes      Resolved Hospital Problems   No resolved problems to display.     Acute gallstone pancreatitis  Acute diarrhea  Patient in agreement with going for laparoscopic tomorrow  Her pain is well-controlled  Continue  mL/h for the next 12 hours then reassess.  N.p.o. at midnight  Continue Norco every 6 as needed for moderate pain  IV morphine for severe pain  Strict intake and output  Management as per surgery team      DVT prophylaxis:  Medical and mechanical DVT prophylaxis orders are present.         Code status is   Code Status and Medical Interventions:   Ordered at: 02/03/24 0152     Code Status (Patient has no pulse and is not breathing):    CPR (Attempt to Resuscitate)     Medical Interventions (Patient has pulse or is breathing):    Full Support       Plan for disposition:home in 2 days    Time:  30 minutes    Signature: Electronically signed by Carlos Llanes Alvarez, MD, 24, 20:08 [facility].  [facility] Hospitalist Team     Electronically signed by Alvarez, Carlos Llanes, MD at 24          Consult Notes (last 48 hours)        Jabier Benson MD at 24 1202        Consult Orders    1. Inpatient General Surgery Consult [[id number]] ordered by Drea Brown APRN                 Inpatient General Surgery Consult  Consult performed by: Jabier Benson MD  Consult ordered by: Drea Brown APRN  Reason for consult: Gallstone pancreatitis          General Surgery Consult Note      Name: Magaly Aggarwal ADMIT: 2024   : 1985  PCP: Tammi Ny MD    MRN: [id number] LOS: 2 days   AGE/SEX: 38 y.o. 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  She is feeling better the pain is improving.  She has been tolerating some diet.  She started to have bowel function is on the looser side.  LFTs initially were elevated and those are improving.  She did have an MRI which did not show any evidence of choledocholithiasis.  But showed cholelithiasis.    History reviewed. No pertinent past medical history.  History reviewed. No pertinent surgical history.  History reviewed. No pertinent family history.    Social History     Tobacco Use    Smoking status: Never     Passive exposure: Never    Smokeless tobacco: Never   Vaping Use    Vaping Use: Never used   Substance Use Topics    Alcohol use: Defer    Drug use: Never     Medications Prior to Admission   Medication Sig Dispense Refill Last Dose     Etonogestrel (NEXPLANON) 68 MG implant subdermal implant Inject 1 each into the appropriate area of the skin as directed by provider 1 (One) Time.        enoxaparin, 40 mg, Subcutaneous, Q24H  senna-docusate sodium, 2 tablet, Oral, BID           senna-docusate sodium **AND** polyethylene glycol **AND** bisacodyl **AND** bisacodyl    HYDROcodone-acetaminophen    Morphine    ondansetron    sodium chloride  Patient has no known allergies.    Review of Systems   Constitutional:  Negative for chills and fever.   HENT:  Negative for sore throat and trouble swallowing.    Eyes:  Negative for blurred vision and double vision.   Respiratory:  Negative for cough and shortness of breath.    Cardiovascular:  Negative for chest pain and leg swelling.   Gastrointestinal:  Positive for abdominal pain, diarrhea, nausea and vomiting. Negative for abdominal distention, blood in stool and constipation.   Genitourinary:  Negative for dysuria and hematuria.   Neurological:  Negative for dizziness and confusion.        Objective     Vital Signs and Labs:  Vital Signs Patient Vitals for the past 24 hrs:   BP Temp Temp src Pulse Resp SpO2   02/05/24 0900 125/77 99.1 °F (37.3 °C) Oral 120 16 97 %   02/05/24 0326 118/79 99.9 °F (37.7 °C) Oral 117 16 95 %   02/04/24 2342 129/83 98 °F (36.7 °C) Oral 113 16 94 %   02/04/24 1942 134/97 -- -- 92 18 100 %   02/04/24 1551 127/76 97.7 °F (36.5 °C) Oral 110 -- 99 %       Physical Exam:  Physical Exam  Constitutional:       Appearance: Normal appearance.   HENT:      Head: Normocephalic and atraumatic.   Eyes:      General: No scleral icterus.     Conjunctiva/sclera: Conjunctivae normal.   Cardiovascular:      Rate and Rhythm: Tachycardia present.   Pulmonary:      Effort: Pulmonary effort is normal. No respiratory distress.   Abdominal:      General: There is no distension.      Palpations: Abdomen is soft.   Skin:     General: Skin is warm and dry.   Neurological:      General: No focal deficit  present.      Mental Status: She is alert. Mental status is at baseline.   Psychiatric:         Mood and Affect: Mood normal.         Behavior: Behavior normal.         CBC    Results from last 7 days   Lab Units 02/05/24  0635 02/04/24  0904 02/03/24  0531 02/02/24  2351   WBC 10*3/mm3 10.70 9.50 10.40 15.60*   HEMOGLOBIN g/dL 12.5 13.0 14.9 15.1   PLATELETS 10*3/mm3 261 264 291 334     CMP   Results from last 7 days   Lab Units 02/05/24  0635 02/04/24  0904 02/03/24  0717 02/02/24  2351   SODIUM mmol/L 132* 138 142 140   POTASSIUM mmol/L 3.2* 3.6 3.8 3.9   CHLORIDE mmol/L 98 101 107 102   CO2 mmol/L 24.0 21.0* 20.0* 21.0*   BUN mg/dL 4* 7 10 13   CREATININE mg/dL 0.50* 0.49* 0.62 0.67   GLUCOSE mg/dL 123* 70 108* 127*   ALBUMIN g/dL 3.9 4.0 4.2 4.8   BILIRUBIN mg/dL 0.6 0.8 1.3* 2.8*   ALK PHOS U/L 318* 334* 405* 487*   AST (SGOT) U/L 39* 68* 387* 759*   ALT (SGPT) U/L 270* 409* 821* 1,073*   LIPASE U/L 25 94* 1,986* >3,000*     Radiology(recent) MRI abdomen w wo contrast mrcp    Result Date: 2/4/2024  Impression: 1. Relatively mild peripancreatic edema at the level of the pancreatic tail and adjacent pancreatic body consistent with pancreatitis and similar to previous CT scan. 2. Multiple small gallstones. No visible gallbladder inflammation. 3. Common hepatic duct and proximal common bile duct at upper limits of normal diameter. No visible common duct stone. Electronically Signed: Amaury Valadez MD  2/4/2024 2:37 PM EST  Workstation ID: [id number]     I reviewed the patient's new clinical results.  I reviewed the patient's new imaging results and agree with the interpretation.    Assessment & Plan       Acute pancreatitis      38 y.o. female admitted with acute pancreatitis likely gallstone pancreatitis.    Talked her about my role in her care.  We talked about the role of cholecystectomy for gallstone pancreatitis.  Talked at the indications for surgery the steps of the operation anticipated recovery.  Talked with  possible complications like bleeding infection incidental injury.  We talked about open surgery and bile duct injury as well.  We talked about life not a gallbladder.  After our discussion about cholecystectomy she initially was hoping to be able to get this done as an interval cholecystectomy which I would be okay with but I also would be willing to take her gallbladder out tomorrow around noon.  She is going to talk about with her  and make final decision but tentatively will schedule the cholecystectomy with intraoperative cholangiogram for tomorrow.       This note was created using Dragon Voice Recognition software.    Jabier Benson MD  02/05/24  12:02 EST    Electronically signed by Jabier Benson MD at 02/05/24 1206          Discharge Summary        Hansel Sim DO at 02/07/24 0923            Discharge Note   Magaly Aggarwal 1985 [id number]     Date of Admission:2/2/2024     Date of Discharge: 02/07/24     Discharge Diagnosis:     S/P laparoscopic cholecystectomy       Consults: GI and general surgery    Hospital Course:     Magaly Aggarwal is a 38 y.o. female with no significant past medical history who presented to [facility] on 2/2/2024 with complaints of abdominal pain.  Per patient, she has been having abdominal pain for the past 1 week.  Stated that she felt bloated.  Abdominal pain is mainly in the epigastric region.  She had some nausea as well as vomiting in the past couple of days. Of note, patient is 4 months postpartum.  Pregnancy was uncomplicated except for high blood pressure on the day of delivery.  Denies any history of pancreatitis.  Denies any history of alcohol use or having gallstones.  Denies any fever or chills.     In the ED, blood pressure 182/116.  Labs remarkable for WBC 15.6, , 6 , ALT 1073, T. bili 2.8, lipase greater than 3000.  CT abdomen shows findings suggestive of acute pancreatitis.  There is a 1 cm  "stone in the dependent aspect of the gallbladder.    GI saw pt, and per their note, \"I have reviewed this documentation and agree.  38-year-old male with acute gallstone pancreatitis.  She is feeling much better today.  She is having less abdominal pain.  She is tolerating her diet without nausea or vomiting.  On exam her abdomen is nondistended good bowel sounds and she is nontender.  White blood count 10.4.  Hemoglobin 14.9.  Lipase 1985 which is decreased from yesterday.  Bilirubin 1.3 which is decreased from yesterday.  MRCP suggests acute pancreatitis but no evidence of choledocholithiasis.  I am recommending low-fat diet.  She may be discharged home when otherwise medically stable.  Will consult general surgery for laparoscopic cholecystectomy.  Follow-up in my office as an outpatient as needed.  We will see as needed.\"    On 2/6, pt was taken to the OR where she underwent laparoscopic cholecystectomy intraoperative cholangiogram for gallstone pancreatitis.  She has done well post-op.  Case d/w surgery.  Pt is stable for d/c at this time and will proceed with that.          Vitals:    02/07/24 0840   BP: 141/83   Pulse: 94   Resp: 18   Temp: 97.8 °F (36.6 °C)   SpO2: 97%        Physical Exam     GEN:  Pleasant.  Appears appropriate for stated age.  WD/WN/WH.  Sitting up in bed on RA.  NAD.  NEURO:  Brainstem reflexes intact.  No obvious focal deficit.  Moves all 4 ext.  HEENT:  N/AT.  PERRL.  MMM.  Oropharynx non-erythematous.  No drainage from the eyes/ears/nose.  No conjunctival petechiae.  No oral thrush.  Auditory and visual acuity grossly wnl.  Good dentition.  Voice normal.  NECK:  Supple, NT, trachea midline.  No meningismus.  No ROM limitation.  No torticollis.     CHEST/LUNGS:  Breath sounds are clear and equal bilaterally.  No w/r/r.  Chest excursion equal bilaterally.    CARDIOVASCULAR:  RRR w/o murmur noted.    GI:  Abdomen soft, NT, ND, +BS.    :  Deferred.  EXTREMITIES:  No deformity or " amputation.  No cyanosis, edema, or asymmetry.  Pulses 2+ and equal in BLE's.    SKIN:  Warm, dry, and pink.  No rash, breakdown, or track marks noted.  LYMPHATICS/HEME:  No overt LAD or abnormal bruising.  No lymphedema.  MSK:  Normal ROM.  No joint abnormalities noted.  Strength is 5/5 and equal in BUE and BLE's.  PSYCH:  Pleasant.  A&Ox 3.  Normal mood and affect.  Responds appropriately to commands and appears to comprehend instructions.            Disposition: Home     Discharged Condition: good    Activity: activity as tolerated    Diet:  Diet Order   Procedures    Diet: Gastrointestinal Diets; Fat-Restricted; Texture: Regular Texture (IDDSI 7); Fluid Consistency: Thin (IDDSI 0)        Labs:    Results from last 7 days   Lab Units 02/06/24  0528 02/05/24  0635 02/04/24  0904 02/03/24  0531 02/02/24  2351   WBC 10*3/mm3 11.10* 10.70 9.50 10.40 15.60*   HEMOGLOBIN g/dL 12.2 12.5 13.0 14.9 15.1   HEMATOCRIT % 37.9 38.2 40.0 45.3 47.7*   PLATELETS 10*3/mm3 262 261 264 291 334       Results from last 7 days   Lab Units 02/06/24  1242 02/05/24  0635 02/04/24  0904 02/03/24  0717 02/02/24  2351   SODIUM mmol/L 138 132* 138 142 140   POTASSIUM mmol/L 3.6 3.2* 3.6 3.8 3.9   CHLORIDE mmol/L 103 98 101 107 102   CO2 mmol/L 25.0 24.0 21.0* 20.0* 21.0*   BUN mg/dL 3* 4* 7 10 13   CREATININE mg/dL 0.48* 0.50* 0.49* 0.62 0.67                  Discharge Medications        New Medications        Instructions Start Date   HYDROcodone-acetaminophen 5-325 MG per tablet  Commonly known as: NORCO   1 tablet, Oral, Every 6 Hours PRN      ondansetron 4 MG tablet  Commonly known as: Zofran   4 mg, Oral, Every 8 Hours PRN             Continue These Medications        Instructions Start Date   Etonogestrel 68 MG implant subdermal implant  Commonly known as: NEXPLANON   1 each, Intradermal, Once                Spent at least 37 minutes in the management of patient's care including but not limited to physical exam, review of vital signs,  labs, cultures and imaging studies, discussing the hospital stay along with plan of care at home, preparation and coordinating of discharge, arranging follow up care and referrals as indicated. Plan also discussed with RN.    Hansel Sim DO  Critical Care  02/07/24   09:23 EST         Electronically signed by Hansel Sim DO at 02/07/24 8214

## 2024-02-07 NOTE — PLAN OF CARE
Goal Outcome Evaluation:  Plan of Care Reviewed With: patient, spouse        Progress: improving  Outcome Evaluation: pt ambulating and voiding adequately. pts pain controlled with PRN medications. pt okay to d/c per MD.

## 2024-02-07 NOTE — PLAN OF CARE
Goal Outcome Evaluation:         Pt resting comfortably throughout the night. Norco x 1 for pain- VSS. Ambulated in hallways this shift.

## 2024-02-07 NOTE — DISCHARGE SUMMARY
"Discharge Note   Magaly Aggarwal 1985 [id number]     Date of Admission:2/2/2024     Date of Discharge: 02/07/24     Discharge Diagnosis:     S/P laparoscopic cholecystectomy       Consults: GI and general surgery    Hospital Course:     Magaly Aggarwal is a 38 y.o. female with no significant past medical history who presented to [facility] on 2/2/2024 with complaints of abdominal pain.  Per patient, she has been having abdominal pain for the past 1 week.  Stated that she felt bloated.  Abdominal pain is mainly in the epigastric region.  She had some nausea as well as vomiting in the past couple of days. Of note, patient is 4 months postpartum.  Pregnancy was uncomplicated except for high blood pressure on the day of delivery.  Denies any history of pancreatitis.  Denies any history of alcohol use or having gallstones.  Denies any fever or chills.     In the ED, blood pressure 182/116.  Labs remarkable for WBC 15.6, , 6 , ALT 1073, T. bili 2.8, lipase greater than 3000.  CT abdomen shows findings suggestive of acute pancreatitis.  There is a 1 cm stone in the dependent aspect of the gallbladder.    GI saw pt, and per their note, \"I have reviewed this documentation and agree.  38-year-old male with acute gallstone pancreatitis.  She is feeling much better today.  She is having less abdominal pain.  She is tolerating her diet without nausea or vomiting.  On exam her abdomen is nondistended good bowel sounds and she is nontender.  White blood count 10.4.  Hemoglobin 14.9.  Lipase 1986 which is decreased from yesterday.  Bilirubin 1.3 which is decreased from yesterday.  MRCP suggests acute pancreatitis but no evidence of choledocholithiasis.  I am recommending low-fat diet.  She may be discharged home when otherwise medically stable.  Will consult general surgery for laparoscopic cholecystectomy.  Follow-up in my office as an outpatient as needed.  We will see as " "needed.\"    On 2/6, pt was taken to the OR where she underwent laparoscopic cholecystectomy intraoperative cholangiogram for gallstone pancreatitis.  She has done well post-op.  Case d/w surgery.  Pt is stable for d/c at this time and will proceed with that.          Vitals:    02/07/24 0840   BP: 141/83   Pulse: 94   Resp: 18   Temp: 97.8 °F (36.6 °C)   SpO2: 97%        Physical Exam     GEN:  Pleasant.  Appears appropriate for stated age.  WD/WN/WH.  Sitting up in bed on RA.  NAD.  NEURO:  Brainstem reflexes intact.  No obvious focal deficit.  Moves all 4 ext.  HEENT:  N/AT.  PERRL.  MMM.  Oropharynx non-erythematous.  No drainage from the eyes/ears/nose.  No conjunctival petechiae.  No oral thrush.  Auditory and visual acuity grossly wnl.  Good dentition.  Voice normal.  NECK:  Supple, NT, trachea midline.  No meningismus.  No ROM limitation.  No torticollis.     CHEST/LUNGS:  Breath sounds are clear and equal bilaterally.  No w/r/r.  Chest excursion equal bilaterally.    CARDIOVASCULAR:  RRR w/o murmur noted.    GI:  Abdomen soft, NT, ND, +BS.    :  Deferred.  EXTREMITIES:  No deformity or amputation.  No cyanosis, edema, or asymmetry.  Pulses 2+ and equal in BLE's.    SKIN:  Warm, dry, and pink.  No rash, breakdown, or track marks noted.  LYMPHATICS/HEME:  No overt LAD or abnormal bruising.  No lymphedema.  MSK:  Normal ROM.  No joint abnormalities noted.  Strength is 5/5 and equal in BUE and BLE's.  PSYCH:  Pleasant.  A&Ox 3.  Normal mood and affect.  Responds appropriately to commands and appears to comprehend instructions.            Disposition: Home     Discharged Condition: good    Activity: activity as tolerated    Diet:  Diet Order   Procedures    Diet: Gastrointestinal Diets; Fat-Restricted; Texture: Regular Texture (IDDSI 7); Fluid Consistency: Thin (IDDSI 0)        Labs:    Results from last 7 days   Lab Units 02/06/24  0528 02/05/24  0635 02/04/24  0904 02/03/24  0531 02/02/24  2351   WBC 10*3/mm3 " 11.10* 10.70 9.50 10.40 15.60*   HEMOGLOBIN g/dL 12.2 12.5 13.0 14.9 15.1   HEMATOCRIT % 37.9 38.2 40.0 45.3 47.7*   PLATELETS 10*3/mm3 262 261 264 291 334       Results from last 7 days   Lab Units 02/06/24  1242 02/05/24  0635 02/04/24  0904 02/03/24  0717 02/02/24  2351   SODIUM mmol/L 138 132* 138 142 140   POTASSIUM mmol/L 3.6 3.2* 3.6 3.8 3.9   CHLORIDE mmol/L 103 98 101 107 102   CO2 mmol/L 25.0 24.0 21.0* 20.0* 21.0*   BUN mg/dL 3* 4* 7 10 13   CREATININE mg/dL 0.48* 0.50* 0.49* 0.62 0.67                  Discharge Medications        New Medications        Instructions Start Date   HYDROcodone-acetaminophen 5-325 MG per tablet  Commonly known as: NORCO   1 tablet, Oral, Every 6 Hours PRN      ondansetron 4 MG tablet  Commonly known as: Zofran   4 mg, Oral, Every 8 Hours PRN             Continue These Medications        Instructions Start Date   Etonogestrel 68 MG implant subdermal implant  Commonly known as: NEXPLANON   1 each, Intradermal, Once                Spent at least 37 minutes in the management of patient's care including but not limited to physical exam, review of vital signs, labs, cultures and imaging studies, discussing the hospital stay along with plan of care at home, preparation and coordinating of discharge, arranging follow up care and referrals as indicated. Plan also discussed with ZACHERY.    Hansel Sim, DO  Critical Care  02/07/24   09:23 EST

## 2024-02-07 NOTE — PROGRESS NOTES
General Surgery Progress Note    Name: Magaly Aggarwal ADMIT: 2024   : 1985  PCP: Tammi Ny MD    MRN: [id number] LOS: 4 days   AGE/SEX: 38 y.o. female  ROOM: [address]    Chief Complaint   Patient presents with    Abdominal Pain     Subjective     38 y.o. female who is status post laparoscopic cholecystectomy with intraoperative cholangiogram on  for gallstone pancreatitis    Feels good today.  No nausea or vomiting tolerating some diet.  Incisional pain is mild.    Objective     Scheduled Medications:   enoxaparin, 40 mg, Subcutaneous, Q24H  potassium chloride, 40 mEq, Oral, Daily        Active Infusions:  lactated ringers, 1,000 mL, Last Rate: Stopped (24 1402)        As Needed Medications:    HYDROcodone-acetaminophen    Morphine    ondansetron    sodium chloride    Vital Signs  Vital Signs Patient Vitals for the past 24 hrs:   BP Temp Temp src Pulse Resp SpO2 Weight   24 0840 141/83 97.8 °F (36.6 °C) Oral 94 18 97 % --   24 0309 111/65 97.4 °F (36.3 °C) Oral 69 15 96 % --   24 2317 114/78 98.3 °F (36.8 °C) Oral 97 17 97 % --   24 2120 -- -- -- -- -- -- 100 kg (220 lb 6.4 oz)   24 1933 137/84 97.8 °F (36.6 °C) Oral 98 17 95 % --   24 1802 -- -- -- 78 -- 97 % --   24 1700 -- -- -- 77 -- 98 % --   24 1640 -- 97.9 °F (36.6 °C) Oral 92 18 97 % --   24 1545 128/88 97.5 °F (36.4 °C) Oral 84 17 97 % --   24 1525 123/79 98 °F (36.7 °C) Oral 71 12 94 % --   24 1510 117/72 -- -- 78 13 93 % --   24 1455 106/73 -- -- 79 14 99 % --   24 1440 110/77 -- -- 81 13 97 % --   24 1425 129/72 -- -- 87 15 98 % --   24 1420 123/77 -- -- -- 17 -- --   24 1415 118/70 -- -- -- 15 -- --   24 1410 123/77 97.6 °F (36.4 °C) Oral -- 18 -- --   24 1223 134/58 98.1 °F (36.7 °C) -- 86 11 96 % --   24 1127 -- 98.3 °F (36.8 °C) Oral 95 20 99 % --       Physical Exam:  Physical  Exam  Constitutional:       Appearance: Normal appearance.   HENT:      Head: Normocephalic and atraumatic.   Eyes:      General: No scleral icterus.     Conjunctiva/sclera: Conjunctivae normal.   Pulmonary:      Effort: Pulmonary effort is normal. No respiratory distress.   Abdominal:      Comments: Abdomen is soft appropriate tender to palpation some mild bruising at the incisions   Neurological:      Mental Status: She is alert.         Results Review:     CBC    Results from last 7 days   Lab Units 02/06/24  0528 02/05/24  0635 02/04/24  0904 02/03/24  0531 02/02/24  2351   WBC 10*3/mm3 11.10* 10.70 9.50 10.40 15.60*   HEMOGLOBIN g/dL 12.2 12.5 13.0 14.9 15.1   PLATELETS 10*3/mm3 262 261 264 291 334     CMP   Results from last 7 days   Lab Units 02/06/24  1242 02/05/24  0635 02/04/24  0904 02/03/24  0717 02/02/24  2351   SODIUM mmol/L 138 132* 138 142 140   POTASSIUM mmol/L 3.6 3.2* 3.6 3.8 3.9   CHLORIDE mmol/L 103 98 101 107 102   CO2 mmol/L 25.0 24.0 21.0* 20.0* 21.0*   BUN mg/dL 3* 4* 7 10 13   CREATININE mg/dL 0.48* 0.50* 0.49* 0.62 0.67   GLUCOSE mg/dL 100* 123* 70 108* 127*   ALBUMIN g/dL 3.7 3.9 4.0 4.2 4.8   BILIRUBIN mg/dL 0.5 0.6 0.8 1.3* 2.8*   ALK PHOS U/L 348* 318* 334* 405* 487*   AST (SGOT) U/L 39* 39* 68* 387* 759*   ALT (SGPT) U/L 191* 270* 409* 821* 1,073*   LIPASE U/L  --  25 94* 1,986* >3,000*       I reviewed the patient's new clinical results.    Assessment & Plan       Acute pancreatitis      38 y.o. female status post laparoscopic cholecystectomy intraoperative cholangiogram for gallstone pancreatitis    Looks good from my standpoint.  Surgery related follow-up instructions have been left in the chart.  I went ahead and sent her pain prescription over to her pharmacy.        This note was created using Dragon Voice Recognition software.    Jabier Benson MD  02/07/24  08:46 EST

## 2024-02-07 NOTE — SIGNIFICANT NOTE
Case Management Discharge Note                Selected Continued Care - Discharged on 2/7/2024 Admission date: 2/2/2024 - Discharge disposition: Home or Self Care              Transportation Services  Private: Car    Final Discharge Disposition Code: (P) 01 - home or self-care

## 2024-02-08 ENCOUNTER — TELEPHONE (OUTPATIENT)
Dept: SURGERY | Facility: CLINIC | Age: 39
End: 2024-02-08
Payer: COMMERCIAL

## 2024-02-08 ENCOUNTER — READMISSION MANAGEMENT (OUTPATIENT)
Dept: CALL CENTER | Facility: HOSPITAL | Age: 39
End: 2024-02-08
Payer: COMMERCIAL

## 2024-02-08 LAB
BACTERIA SPEC AEROBE CULT: NORMAL
LAB AP CASE REPORT: NORMAL
PATH REPORT.FINAL DX SPEC: NORMAL
PATH REPORT.GROSS SPEC: NORMAL

## 2024-02-08 NOTE — OUTREACH NOTE
Prep Survey      Flowsheet Row Responses   [facility] facility patient discharged from? [facility]   Is LACE score < 7 ? No   Eligibility Readm Mgmt   Discharge diagnosis Acute gallstone pancreatitis, lap choley   Does the patient have one of the following disease processes/diagnoses(primary or secondary)? General Surgery   Does the patient have Home health ordered? No   Is there a DME ordered? No   Medication alerts for this patient see avs   Prep survey completed? Yes            Althea GRANADOS - Registered Nurse

## 2024-02-09 ENCOUNTER — TELEPHONE (OUTPATIENT)
Dept: SURGERY | Facility: CLINIC | Age: 39
End: 2024-02-09
Payer: COMMERCIAL

## 2024-02-09 NOTE — TELEPHONE ENCOUNTER
Patient called back to book post op appt. At patient request booked in 3 weeks. She is doing well after surgery no questions or concerns

## 2024-02-14 ENCOUNTER — READMISSION MANAGEMENT (OUTPATIENT)
Dept: CALL CENTER | Facility: HOSPITAL | Age: 39
End: 2024-02-14
Payer: COMMERCIAL

## 2024-02-19 ENCOUNTER — READMISSION MANAGEMENT (OUTPATIENT)
Dept: CALL CENTER | Facility: HOSPITAL | Age: 39
End: 2024-02-19
Payer: COMMERCIAL

## 2024-02-19 NOTE — OUTREACH NOTE
General Surgery Week 1 Survey      Flowsheet Row Responses   [facility] facility patient discharged from? [facility]   Does the patient have one of the following disease processes/diagnoses(primary or secondary)? General Surgery   Week 1 attempt successful? No   Unsuccessful attempts Attempt 2            Layne VELA - Registered Nurse

## 2024-02-21 ENCOUNTER — READMISSION MANAGEMENT (OUTPATIENT)
Dept: CALL CENTER | Facility: HOSPITAL | Age: 39
End: 2024-02-21
Payer: COMMERCIAL

## 2024-02-21 NOTE — OUTREACH NOTE
General Surgery Week 1 Survey      Flowsheet Row Responses   [facility] facility patient discharged from? [facility]   Does the patient have one of the following disease processes/diagnoses(primary or secondary)? General Surgery   Week 1 attempt successful? No   Unsuccessful attempts Attempt 3            Unique MERCADO - Registered Nurse

## 2024-02-27 ENCOUNTER — OFFICE VISIT (OUTPATIENT)
Dept: SURGERY | Facility: CLINIC | Age: 39
End: 2024-02-27
Payer: COMMERCIAL

## 2024-02-27 VITALS
TEMPERATURE: 97.7 F | DIASTOLIC BLOOD PRESSURE: 80 MMHG | OXYGEN SATURATION: 98 % | RESPIRATION RATE: 18 BRPM | BODY MASS INDEX: 31.07 KG/M2 | WEIGHT: 217 LBS | HEIGHT: 70 IN | HEART RATE: 112 BPM | SYSTOLIC BLOOD PRESSURE: 117 MMHG

## 2024-02-27 DIAGNOSIS — K85.10 GALLSTONE PANCREATITIS: Primary | ICD-10-CM

## 2024-02-27 PROCEDURE — 99024 POSTOP FOLLOW-UP VISIT: CPT | Performed by: SURGERY

## 2024-02-27 NOTE — PROGRESS NOTES
"Subjective   Magaly Tiff Leonor Aggarwal is a 38 y.o. female.   Couple weeks out from laparoscopic cholecystectomy With cholangiogram.  General recovery has gone quite well.  No major issues.  She is tolerating diet send regular bowel function no incisional concerns.    Objective   /80 (BP Location: Left arm, Patient Position: Sitting, Cuff Size: Large Adult)   Pulse 112   Temp 97.7 °F (36.5 °C) (Infrared)   Resp 18   Ht 177.8 cm (70\")   Wt 98.4 kg (217 lb)   LMP  (LMP Unknown)   SpO2 98%   BMI 31.14 kg/m²   Physical Exam  Abdominal:      General: There is no distension.      Palpations: Abdomen is soft.      Comments: Incisions are healing well without any erythema drainage or evidence of hernia   Neurological:      General: No focal deficit present.       Assessment & Plan   Diagnoses and all orders for this visit:    1. Gallstone pancreatitis (Primary)    Couple weeks out from laparoscopic cholecystectomy intraoperative cholangiogram for gallstone pancreatitis.  She is recovering well.  Okay to increase activity as tolerated.  Diet as tolerated.  Follow-up as needed.    Jabier Benson MD  2/27/2024  9:16 AM EST    This note was created using Dragon Voice Recognition software.  "

## (undated) DEVICE — CVR HNDL LT SURG ACCSSRY BLU STRL

## (undated) DEVICE — GLV SURG BIOGEL LTX PF 7

## (undated) DEVICE — BLANKT WARM UPPR/BDY ARM/OUT 57X196CM

## (undated) DEVICE — TROC BLADLES XCEL/OPTIVW SLV THRD 5X100

## (undated) DEVICE — UNDERGLV SURG BIOGEL INDICATOR LTX PF 7

## (undated) DEVICE — PK GENERAL LAPAROSCOPY 50

## (undated) DEVICE — PASS SUT PRO BARIATRIC XL W/TROC SWABS

## (undated) DEVICE — SOL IRR H2O BTL 1000ML STRL

## (undated) DEVICE — SUT VIC 0 UR6 27IN VCP603H

## (undated) DEVICE — ADHS SKIN PREMIERPRO EXOFIN TOPICAL HI/VISC .5ML

## (undated) DEVICE — SCISS ENDOPATH CRV 5MM

## (undated) DEVICE — ST TBG INSUFL ENDOFLATOR50 HEAT W/GAS/FLTR 1P/U

## (undated) DEVICE — SLV SCD CALF HEMOFORCE DVT THERP REPROC MD

## (undated) DEVICE — TROC BLADLES XCEL/OPTI SLV THRD 12X100

## (undated) DEVICE — KT SURG TURNOVER 050

## (undated) DEVICE — SUT VIC 4/0 PS2 18IN VCP496H